# Patient Record
Sex: FEMALE | Race: BLACK OR AFRICAN AMERICAN | NOT HISPANIC OR LATINO
[De-identification: names, ages, dates, MRNs, and addresses within clinical notes are randomized per-mention and may not be internally consistent; named-entity substitution may affect disease eponyms.]

---

## 2021-04-14 PROBLEM — Z00.00 ENCOUNTER FOR PREVENTIVE HEALTH EXAMINATION: Status: ACTIVE | Noted: 2021-04-14

## 2021-04-27 PROBLEM — Z86.2 HISTORY OF COAGULATION DEFECT: Status: RESOLVED | Noted: 2021-04-27 | Resolved: 2021-04-27

## 2021-04-27 PROBLEM — Z80.41 FAMILY HISTORY OF MALIGNANT NEOPLASM OF OVARY: Status: ACTIVE | Noted: 2021-04-27

## 2021-04-27 PROBLEM — Z80.3 FAMILY HISTORY OF MALIGNANT NEOPLASM OF BREAST: Status: ACTIVE | Noted: 2021-04-27

## 2021-04-27 PROBLEM — Z86.39 HISTORY OF HYPERCHOLESTEROLEMIA: Status: RESOLVED | Noted: 2021-04-27 | Resolved: 2021-04-27

## 2021-04-27 PROBLEM — G45.9 TIA (TRANSIENT ISCHEMIC ATTACK): Status: RESOLVED | Noted: 2021-04-27 | Resolved: 2021-04-27

## 2021-04-30 ENCOUNTER — RESULT REVIEW (OUTPATIENT)
Age: 71
End: 2021-04-30

## 2021-04-30 ENCOUNTER — APPOINTMENT (OUTPATIENT)
Dept: HEMATOLOGY ONCOLOGY | Facility: CLINIC | Age: 71
End: 2021-04-30
Payer: MEDICARE

## 2021-04-30 VITALS
HEART RATE: 95 BPM | HEIGHT: 64.96 IN | SYSTOLIC BLOOD PRESSURE: 169 MMHG | WEIGHT: 188 LBS | BODY MASS INDEX: 31.32 KG/M2 | DIASTOLIC BLOOD PRESSURE: 74 MMHG | TEMPERATURE: 98.1 F | RESPIRATION RATE: 16 BRPM | OXYGEN SATURATION: 97 %

## 2021-04-30 DIAGNOSIS — Z80.41 FAMILY HISTORY OF MALIGNANT NEOPLASM OF OVARY: ICD-10-CM

## 2021-04-30 DIAGNOSIS — Z86.2 PERSONAL HISTORY OF DISEASES OF THE BLOOD AND BLOOD-FORMING ORGANS AND CERTAIN DISORDERS INVOLVING THE IMMUNE MECHANISM: ICD-10-CM

## 2021-04-30 DIAGNOSIS — Z86.39 PERSONAL HISTORY OF OTHER ENDOCRINE, NUTRITIONAL AND METABOLIC DISEASE: ICD-10-CM

## 2021-04-30 DIAGNOSIS — Z80.3 FAMILY HISTORY OF MALIGNANT NEOPLASM OF BREAST: ICD-10-CM

## 2021-04-30 DIAGNOSIS — R19.5 OTHER FECAL ABNORMALITIES: ICD-10-CM

## 2021-04-30 DIAGNOSIS — G45.9 TRANSIENT CEREBRAL ISCHEMIC ATTACK, UNSPECIFIED: ICD-10-CM

## 2021-04-30 PROCEDURE — 99205 OFFICE O/P NEW HI 60 MIN: CPT

## 2021-04-30 RX ORDER — AMLODIPINE BESYLATE VALSARTAN HYDROCHLOROTHIAZIDE 10; 25; 320 MG/1; MG/1; MG/1
10-320-25 TABLET, FILM COATED ORAL
Refills: 0 | Status: COMPLETED | COMMUNITY
Start: 2021-04-27 | End: 2021-04-30

## 2021-04-30 RX ORDER — TERBINAFINE HYDROCHLORIDE 250 MG/1
250 TABLET ORAL
Refills: 0 | Status: COMPLETED | COMMUNITY
Start: 2021-04-27 | End: 2021-04-30

## 2021-04-30 NOTE — ASSESSMENT
[FreeTextEntry1] : #  ER + 100%, AL + 5%, Her 2 neg  R sided breast cancer T2N1 stage IIB in 2015, s/p neoadjuvant ddAC-T 5/2016, s/p right mastectomy and ALND 6/2016\par - remains on arimadex - would keep for a minimum of 10 years. Continue with ca++1200, vit D 1000 IU\par CA 27 -29 wnl, elevated CEA\par she is not having symptoms will monitor and if starting to complain of symptoms or abnormalities in blood work will consider obtaining PET\par will check CBC with diff, CMP and vit D.\par \par #joint pains - knees\par 2/2 arimadex\par continue tylenol arthritis and voltaren gel\par \par #lymphedema - will refer to PT\par \par #elevated CEA  and loose stools\par last CNY 2018 - will send to Dr. Alicia Gilbert \par \par #HTN - will refer to Dr. Anisha Hui\par needs better control of BP\par \par #tobacco abuse and elevated CEA\par Will check LD - CT\par \par RTC in 3 months with CBC with diff, CMP, Vit D\par \par

## 2021-04-30 NOTE — PHYSICAL EXAM
[Fully active, able to carry on all pre-disease performance without restriction] : Status 0 - Fully active, able to carry on all pre-disease performance without restriction [Normal] : affect appropriate [de-identified] : R sided mastectomy - L side without masses or axillary adenopathy

## 2021-04-30 NOTE — HISTORY OF PRESENT ILLNESS
[de-identified] : Ms. Petersen is a 70 year old woman who presents for consultation of right breast cancer.\par She reports getting annual mammograms and they were normal until her annual .\par She was previously seen and treated by Dr Wilcox.; diagnosed T2N1 stage IIB in , s/p neoadjuvant ddAC-T 2016, s/p right mastectomy and ALND 2016\par ER + 100%, DE + 5%, Her 2 neg\par Started arimidex 2016, delay in wound healing, wound dehiscence and possible infection\par s/p XRT 2016-2016\par Last vsit with Dr. 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, right thyroid nodule 1.3cm\par 2020 Flow cytometry for erythrocytosis No evidence or immunophenotypic evidence of lymphoproliferative disorder, high grade myelodysplastia, or acute lekemia\par \par She reports numbness to fingers and toes (toes worse), occasionally feels dizzy/lightheaded, taste buds compromised, chronic constipation.\par \par Age of Menarche: 14\par Age of Menopause: 49\par OCP/HRT: denies\par [id number], miscarriage x1\par \par Smoke: started age 20, 2-3 cigarettes per day, quit for a few years, no back to occasional cigarettes due to stress\par ETOH: 1-2 drinks x 2 nights per week\par Illicit: denies\par \par Health Maintenance\par Mammo/US: 2021- normal, not concern\par DEXA: due, last 2018, normal\par Last GYN 2018, due\par PET 2018 unremarkable\par CEA 6.8 (6.2 2021) and Ca 27-29 18.1 (18.9 2021)\par Colonoscopy/EGD last , Dr. 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

## 2021-04-30 NOTE — CONSULT LETTER
[Dear  ___] : Dear  [unfilled], [Consult Letter:] : I had the pleasure of evaluating your patient, [unfilled]. [Please see my note below.] : Please see my note below. [Consult Closing:] : Thank you very much for allowing me to participate in the care of this patient.  If you have any questions, please do not hesitate to contact me. [Sincerely,] : Sincerely, [FreeTextEntry3] : Paola Hui DO, FACBO, FACP\par Medical Oncology and Hematology\par [facility] [place]\par  DC instructions

## 2021-05-27 ENCOUNTER — RESULT REVIEW (OUTPATIENT)
Age: 71
End: 2021-05-27

## 2021-06-16 ENCOUNTER — APPOINTMENT (OUTPATIENT)
Dept: INTERNAL MEDICINE | Facility: CLINIC | Age: 71
End: 2021-06-16
Payer: MEDICARE

## 2021-06-16 ENCOUNTER — NON-APPOINTMENT (OUTPATIENT)
Age: 71
End: 2021-06-16

## 2021-06-16 VITALS
OXYGEN SATURATION: 100 % | HEART RATE: 82 BPM | SYSTOLIC BLOOD PRESSURE: 178 MMHG | TEMPERATURE: 98.2 F | DIASTOLIC BLOOD PRESSURE: 76 MMHG

## 2021-06-16 VITALS — HEIGHT: 65 IN | WEIGHT: 188 LBS | BODY MASS INDEX: 31.32 KG/M2

## 2021-06-16 DIAGNOSIS — Z83.49 FAMILY HISTORY OF OTHER ENDOCRINE, NUTRITIONAL AND METABOLIC DISEASES: ICD-10-CM

## 2021-06-16 DIAGNOSIS — Z76.89 PERSONS ENCOUNTERING HEALTH SERVICES IN OTHER SPECIFIED CIRCUMSTANCES: ICD-10-CM

## 2021-06-16 PROCEDURE — 93000 ELECTROCARDIOGRAM COMPLETE: CPT

## 2021-06-16 PROCEDURE — 99203 OFFICE O/P NEW LOW 30 MIN: CPT | Mod: 25

## 2021-06-16 PROCEDURE — 36415 COLL VENOUS BLD VENIPUNCTURE: CPT

## 2021-06-22 LAB
CHOLEST SERPL-MCNC: 163 MG/DL
HDLC SERPL-MCNC: 39 MG/DL
LDLC SERPL CALC-MCNC: 91 MG/DL
NONHDLC SERPL-MCNC: 125 MG/DL
TRIGL SERPL-MCNC: 167 MG/DL
TSH SERPL-ACNC: 1.77 UIU/ML

## 2021-06-24 NOTE — HISTORY OF PRESENT ILLNESS
[FreeTextEntry1] : New Patient [de-identified] : 71 year-old lady here to establish medical care.  Her previous primary care physician does not accept her current insurance.\par She has a history of hypertension, hyperlipidemia, breast cancer, status post right mastectomy, Chemotherapy, Radiation Therapy, right upper extremity lymphedema, erythrocytosis, coagulopathy, chronic eczema, Thyroid Nodule noted on CTA of Neck, 10/4/2016.\par Eczema started in October. She was seen by Dermatologist for eczema and is currently on topical steroids, Xyzal for the itching.\par She is followed by Dr. Hui: Oncologist.\par Her last eye exam 2 weeks ago; she is being treated for a Viral eye infection.\par She had the COVID Vaccines in April.

## 2021-06-24 NOTE — REVIEW OF SYSTEMS
[Itching] : Itching [Skin Rash] : skin rash [Fever] : no fever [Chills] : no chills [Redness] : no redness [Earache] : no earache [Chest Pain] : no chest pain [Palpitations] : no palpitations [Lower Ext Edema] : no lower extremity edema [Shortness Of Breath] : no shortness of breath [Cough] : no cough [Abdominal Pain] : no abdominal pain [Nausea] : no nausea [Constipation] : no constipation [Melena] : no melena [Dysuria] : no dysuria [Headache] : no headache [Dizziness] : no dizziness [Swollen Glands] : no swollen glands [de-identified] : has diffuse rash on head, back, chest, arms, legs [de-identified] : she is on Bupropion for depression

## 2021-06-24 NOTE — HEALTH RISK ASSESSMENT
[Good] : ~his/her~  mood as  good [] : Yes [Yes] : Yes [2 - 4 times a month (2 pts)] : 2-4 times a month (2 points) [1 or 2 (0 pts)] : 1 or 2 (0 points) [Never (0 pts)] : Never (0 points) [No] : In the past 12 months have you used drugs other than those required for medical reasons? No [No falls in past year] : Patient reported no falls in the past year [Patient reported mammogram was normal] : Patient reported mammogram was normal [Patient reported PAP Smear was normal] : Patient reported PAP Smear was normal [Patient reported bone density results were normal] : Patient reported bone density results were normal [HIV test declined] : HIV test declined [Hepatitis C test declined] : Hepatitis C test declined [0] : 2) Feeling down, depressed, or hopeless: Not at all (0) [FreeTextEntry1] : eczema   [de-identified] : oncology  [de-identified] : smokes here and there  [Audit-CScore] : 2 [de-identified] : daily walks  [de-identified] : normal diet rarely eats beef. Hadly has appetite may eat once a day. [[id number]] : 0 [MammogramDate] : 02/2021 [PapSmearDate] : 01/18 [BoneDensityDate] : 05/2` [ColonoscopyComments] : 19 yrs ago

## 2021-06-24 NOTE — PHYSICAL EXAM
[No Acute Distress] : no acute distress [PERRL] : pupils equal round and reactive to light [EOMI] : extraocular movements intact [No JVD] : no jugular venous distention [No Lymphadenopathy] : no lymphadenopathy [No Respiratory Distress] : no respiratory distress  [Clear to Auscultation] : lungs were clear to auscultation bilaterally [Normal Rate] : normal rate  [Regular Rhythm] : with a regular rhythm [Normal S1, S2] : normal S1 and S2 [No Murmur] : no murmur heard [Pedal Pulses Present] : the pedal pulses are present [Soft] : abdomen soft [Non Tender] : non-tender [Normal Bowel Sounds] : normal bowel sounds [No Focal Deficits] : no focal deficits [Normal Gait] : normal gait [Deep Tendon Reflexes (DTR)] : deep tendon reflexes were 2+ and symmetric [Speech Grossly Normal] : speech grossly normal [Normal Affect] : the affect was normal [Alert and Oriented x3] : oriented to person, place, and time [Normal Insight/Judgement] : insight and judgment were intact [Normal Sclera/Conjunctiva] : normal sclera/conjunctiva [Normal TMs] : both tympanic membranes were normal [No Carotid Bruits] : no carotid bruits [No CVA Tenderness] : no CVA  tenderness [de-identified] : right upper ext edema [de-identified] : healed surgical scar [de-identified] : right upper ext edema [de-identified] : dry, hyperpigmented plaques on back, arms. Scaly rash on scalp

## 2021-07-26 ENCOUNTER — APPOINTMENT (OUTPATIENT)
Dept: HEMATOLOGY ONCOLOGY | Facility: CLINIC | Age: 71
End: 2021-07-26
Payer: MEDICARE

## 2021-08-18 ENCOUNTER — APPOINTMENT (OUTPATIENT)
Dept: HEMATOLOGY ONCOLOGY | Facility: CLINIC | Age: 71
End: 2021-08-18
Payer: MEDICARE

## 2021-08-18 ENCOUNTER — RESULT REVIEW (OUTPATIENT)
Age: 71
End: 2021-08-18

## 2021-08-18 VITALS
TEMPERATURE: 98.3 F | SYSTOLIC BLOOD PRESSURE: 134 MMHG | HEART RATE: 96 BPM | HEIGHT: 66 IN | RESPIRATION RATE: 16 BRPM | BODY MASS INDEX: 29.36 KG/M2 | DIASTOLIC BLOOD PRESSURE: 80 MMHG | OXYGEN SATURATION: 100 % | WEIGHT: 182.7 LBS

## 2021-08-18 DIAGNOSIS — M19.90 UNSPECIFIED OSTEOARTHRITIS, UNSPECIFIED SITE: ICD-10-CM

## 2021-08-18 DIAGNOSIS — Z86.69 PERSONAL HISTORY OF OTHER DISEASES OF THE NERVOUS SYSTEM AND SENSE ORGANS: ICD-10-CM

## 2021-08-18 PROCEDURE — 99214 OFFICE O/P EST MOD 30 MIN: CPT

## 2021-08-19 NOTE — CONSULT LETTER
[Dear  ___] : Dear  [unfilled], [Consult Letter:] : I had the pleasure of evaluating your patient, [unfilled]. [Please see my note below.] : Please see my note below. [Consult Closing:] : Thank you very much for allowing me to participate in the care of this patient.  If you have any questions, please do not hesitate to contact me. [Sincerely,] : Sincerely, [FreeTextEntry3] : Paola Hui DO, FACBO, FACP\par Medical Oncology and Hematology\par [facility] [place]\par

## 2021-08-19 NOTE — REVIEW OF SYSTEMS
[Negative] : Allergic/Immunologic [Fever] : fever [Chills] : chills [Fatigue] : fatigue [Recent Change In Weight] : ~T recent weight change [Eye Pain] : eye pain [Red Eyes] : red eyes [FreeTextEntry3] : itchy [de-identified] : eczema

## 2021-08-19 NOTE — HISTORY OF PRESENT ILLNESS
[de-identified] : Patient seen and examined and here today for follow up\par Denies complaints, overall feeling well.\par Wants PT for lymphedema\par Tolerating AI - has some arthritis/arthralgias and wants to go to PT [de-identified] : Ms. Petersen is a 70 year old woman who presents for consultation of right breast cancer.\par She reports getting annual mammograms and they were normal until her annual .\par She was previously seen and treated by Dr Wilcox.; diagnosed T2N1 stage IIB in , s/p neoadjuvant ddAC-T 2016, s/p right mastectomy and ALND 2016\par ER + 100%, CT + 5%, Her 2 neg\par Started arimidex 2016, delay in wound healing, wound dehiscence and possible infection\par s/p XRT 2016-2016\par Last vsit with Dr. 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, right thyroid nodule 1.3cm\par 2020 Flow cytometry for erythrocytosis No evidence or immunophenotypic evidence of lymphoproliferative disorder, high grade myelodysplastia, or acute lekemia\par \par She reports numbness to fingers and toes (toes worse), occasionally feels dizzy/lightheaded, taste buds compromised, chronic constipation.\par \par Age of Menarche: 14\par Age of Menopause: 49\par OCP/HRT: denies\par [id number], miscarriage x1\par \par Smoke: started age 20, 2-3 cigarettes per day, quit for a few years, no back to occasional cigarettes due to stress\par ETOH: 1-2 drinks x 2 nights per week\par Illicit: denies\par \par Health Maintenance\par Mammo/US: 2021- normal, not concern\par DEXA: due, last 2018, normal\par Last GYN 2018, due\par PET 2018 unremarkable\par CEA 6.8 (6.2 2021) and Ca 27-29 18.1 (18.9 2021)\par Colonoscopy/EGD last , Dr. 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

## 2021-08-19 NOTE — ASSESSMENT
[FreeTextEntry1] : #  ER + 100%, TN + 5%, Her 2 neg  R sided breast cancer T2N1 stage IIB in 2015, s/p neoadjuvant ddAC-T 5/2016, s/p right mastectomy and ALND 6/2016\par - remains on arimadex - would keep for a minimum of 10 years. Continue with ca++1200, vit D 1000 IU\par CA 27 -29 wnl, elevated CEA\par \par #joint pains - knees\par 2/2 arimadex\par continue tylenol arthritis and voltaren gel\par referred for PT\par \par #lymphedema - will refer to PT\par \par #elevated CEA  and loose stools\par last CNY 2018 - will send to Dr. Alicia Gilbert \par \par #HTN - will refer to Dr. Anisha Hui\par needs better control of BP\par \par #tobacco abuse and elevated CEA\par check LD - CT - she didn't get it the last time\par \par RTC in 3 months with CBC with diff, CMP, Vit D\par \par

## 2021-08-19 NOTE — PHYSICAL EXAM
[Fully active, able to carry on all pre-disease performance without restriction] : Status 0 - Fully active, able to carry on all pre-disease performance without restriction [Normal] : affect appropriate [de-identified] : R sided mastectomy - L side without masses or axillary adenopathy

## 2021-10-12 ENCOUNTER — APPOINTMENT (OUTPATIENT)
Dept: INTERNAL MEDICINE | Facility: CLINIC | Age: 71
End: 2021-10-12
Payer: MEDICARE

## 2021-10-12 ENCOUNTER — NON-APPOINTMENT (OUTPATIENT)
Age: 71
End: 2021-10-12

## 2021-10-12 ENCOUNTER — LABORATORY RESULT (OUTPATIENT)
Age: 71
End: 2021-10-12

## 2021-10-12 VITALS
OXYGEN SATURATION: 99 % | RESPIRATION RATE: 16 BRPM | WEIGHT: 178 LBS | BODY MASS INDEX: 28.61 KG/M2 | SYSTOLIC BLOOD PRESSURE: 144 MMHG | DIASTOLIC BLOOD PRESSURE: 88 MMHG | HEIGHT: 66 IN | TEMPERATURE: 98 F | HEART RATE: 94 BPM

## 2021-10-12 DIAGNOSIS — Z23 ENCOUNTER FOR IMMUNIZATION: ICD-10-CM

## 2021-10-12 PROCEDURE — 99213 OFFICE O/P EST LOW 20 MIN: CPT | Mod: 25

## 2021-10-12 PROCEDURE — G0008: CPT

## 2021-10-12 PROCEDURE — 90662 IIV NO PRSV INCREASED AG IM: CPT

## 2021-10-12 PROCEDURE — 36415 COLL VENOUS BLD VENIPUNCTURE: CPT

## 2021-10-12 PROCEDURE — 99203 OFFICE O/P NEW LOW 30 MIN: CPT | Mod: 25

## 2021-10-12 PROCEDURE — 93000 ELECTROCARDIOGRAM COMPLETE: CPT

## 2021-10-18 LAB
ALBUMIN SERPL ELPH-MCNC: 3.8 G/DL
ALP BLD-CCNC: 84 U/L
ALT SERPL-CCNC: 12 U/L
ANION GAP SERPL CALC-SCNC: 13 MMOL/L
APPEARANCE: ABNORMAL
APTT BLD: 32.3 SEC
AST SERPL-CCNC: 15 U/L
BACTERIA: ABNORMAL
BASOPHILS # BLD AUTO: 0.07 K/UL
BASOPHILS NFR BLD AUTO: 1.5 %
BILIRUB SERPL-MCNC: 0.7 MG/DL
BILIRUBIN URINE: ABNORMAL
BLOOD URINE: NEGATIVE
BUN SERPL-MCNC: 6 MG/DL
CALCIUM SERPL-MCNC: 10.1 MG/DL
CHLORIDE SERPL-SCNC: 99 MMOL/L
CHOLEST SERPL-MCNC: 190 MG/DL
CO2 SERPL-SCNC: 28 MMOL/L
COLOR: ABNORMAL
CREAT SERPL-MCNC: 0.88 MG/DL
EOSINOPHIL # BLD AUTO: 0.29 K/UL
EOSINOPHIL NFR BLD AUTO: 6.2 %
GLUCOSE QUALITATIVE U: NEGATIVE
GLUCOSE SERPL-MCNC: 122 MG/DL
HCT VFR BLD CALC: 48 %
HDLC SERPL-MCNC: 46 MG/DL
HGB BLD-MCNC: 15.6 G/DL
HYALINE CASTS: 0 /LPF
IMM GRANULOCYTES NFR BLD AUTO: 0.4 %
INR PPP: 0.98 RATIO
KETONES URINE: NEGATIVE
LDLC SERPL CALC-MCNC: 122 MG/DL
LEUKOCYTE ESTERASE URINE: ABNORMAL
LYMPHOCYTES # BLD AUTO: 0.53 K/UL
LYMPHOCYTES NFR BLD AUTO: 11.3 %
MAN DIFF?: NORMAL
MCHC RBC-ENTMCNC: 32.5 GM/DL
MCHC RBC-ENTMCNC: 35.9 PG
MCV RBC AUTO: 110.3 FL
MICROSCOPIC-UA: NORMAL
MONOCYTES # BLD AUTO: 0.59 K/UL
MONOCYTES NFR BLD AUTO: 12.6 %
NEUTROPHILS # BLD AUTO: 3.19 K/UL
NEUTROPHILS NFR BLD AUTO: 68 %
NITRITE URINE: NEGATIVE
NONHDLC SERPL-MCNC: 145 MG/DL
PH URINE: 5.5
PLATELET # BLD AUTO: 277 K/UL
POTASSIUM SERPL-SCNC: 4.1 MMOL/L
PROT SERPL-MCNC: 6.7 G/DL
PROTEIN URINE: ABNORMAL
PT BLD: 11.6 SEC
RBC # BLD: 4.35 M/UL
RBC # FLD: 21.2 %
RED BLOOD CELLS URINE: 3 /HPF
SODIUM SERPL-SCNC: 139 MMOL/L
SPECIFIC GRAVITY URINE: 1.03
SQUAMOUS EPITHELIAL CELLS: 20 /HPF
TRIGL SERPL-MCNC: 115 MG/DL
URINE COMMENTS: NORMAL
UROBILINOGEN URINE: ABNORMAL
WBC # FLD AUTO: 4.69 K/UL
WHITE BLOOD CELLS URINE: 4 /HPF

## 2021-10-22 NOTE — REVIEW OF SYSTEMS
[Skin Rash] : skin rash [Anxiety] : anxiety [Fever] : no fever [Chills] : no chills [Pain] : no pain [Redness] : no redness [Earache] : no earache [Hoarseness] : no hoarseness [Sore Throat] : no sore throat [Postnasal Drip] : no postnasal drip [Chest Pain] : no chest pain [Palpitations] : no palpitations [Lower Ext Edema] : no lower extremity edema [Shortness Of Breath] : no shortness of breath [Cough] : no cough [Abdominal Pain] : no abdominal pain [Constipation] : no constipation [Diarrhea] : diarrhea [Dysuria] : no dysuria [Joint Swelling] : no joint swelling [Headache] : no headache [Dizziness] : no dizziness [Suicidal] : not suicidal [Depression] : no depression [Easy Bleeding] : no easy bleeding [Swollen Glands] : no swollen glands [FreeTextEntry3] : last eye exam 1 day ago; has lesions on lower lids [FreeTextEntry9] : Pain in joints

## 2021-10-22 NOTE — HISTORY OF PRESENT ILLNESS
[No Adverse Anesthesia Reaction] : no adverse anesthesia reaction in self or family member [Coronary Artery Disease] : coronary artery disease [Aortic Stenosis] : no aortic stenosis [Atrial Fibrillation] : no atrial fibrillation [Recent Myocardial Infarction] : no recent myocardial infarction [Implantable Device/Pacemaker] : no implantable device/pacemaker [Asthma] : no asthma [COPD] : no COPD [Sleep Apnea] : no sleep apnea [Smoker] : not a smoker [Chronic Anticoagulation] : no chronic anticoagulation [Chronic Kidney Disease] : no chronic kidney disease [Diabetes] : no diabetes [FreeTextEntry1] : Removal of Lower Eye Lid Lesions and Blepharoplasty [FreeTextEntry2] : 11/09/2021 [FreeTextEntry4] : Preoperative medical evaluation of this 71 year-old lady for removal of Lower Eyelid Lesions and Blepharoplasty.\par She has a history of Hypertension, Hyperlipidemia, Thyroid Nodule, Breast Cancer status post right mastectomy, chemotherapy and radiation therapy.  She has Chronic Lymphedema on the right upper Extremity.\par She denies complications with Anesthesia in the past and has no history of Bleeding Diathesis.

## 2021-10-22 NOTE — PHYSICAL EXAM
[No Acute Distress] : no acute distress [Normal Sclera/Conjunctiva] : normal sclera/conjunctiva [EOMI] : extraocular movements intact [No JVD] : no jugular venous distention [No Lymphadenopathy] : no lymphadenopathy [No Respiratory Distress] : no respiratory distress  [Clear to Auscultation] : lungs were clear to auscultation bilaterally [Normal Rate] : normal rate  [Regular Rhythm] : with a regular rhythm [Normal S1, S2] : normal S1 and S2 [No Murmur] : no murmur heard [No Carotid Bruits] : no carotid bruits [Pedal Pulses Present] : the pedal pulses are present [No Edema] : there was no peripheral edema [No Axillary Lymphadenopathy] : no axillary lymphadenopathy [Soft] : abdomen soft [Non Tender] : non-tender [Normal Bowel Sounds] : normal bowel sounds [Normal Supraclavicular Nodes] : no supraclavicular lymphadenopathy [Normal Axillary Nodes] : no axillary lymphadenopathy [Normal Posterior Cervical Nodes] : no posterior cervical lymphadenopathy [Normal Anterior Cervical Nodes] : no anterior cervical lymphadenopathy [No CVA Tenderness] : no CVA  tenderness [No Joint Swelling] : no joint swelling [Normal] : normal gait, coordination grossly intact, no focal deficits and deep tendon reflexes were 2+ and symmetric [Normal Outer Ear/Nose] : the outer ears and nose were normal in appearance [Normal TMs] : both tympanic membranes were normal [de-identified] : Whitish, nodular lesions on right and left lower lids [de-identified] : s/p right Mastectomy [FreeTextEntry1] : decrease Lymphedema of right upper extremity [de-identified] : dry skin; hyperpigmentation noted

## 2021-10-22 NOTE — ASSESSMENT
[Patient Optimized for Surgery] : Patient optimized for surgery [No Further Testing Recommended] : no further testing recommended [FreeTextEntry4] : 1.  There is no absolute contraindication to planned surgical procedure. \par 2.  Patient is at low risk for cardiac complications: she has no chest pain, no acute Ischemic changes on EKG, no history of Diabetes.\par 3.  Preoperative Labs reviewed and are within acceptable limits.\par 4.  Patient is Medically Stable to undergo Removal of Eyelid Lesions and Blepharoplasty. \par 5.  Patient is aware to hold Aspirin one week prior to Surgery.\par

## 2021-10-26 ENCOUNTER — RESULT REVIEW (OUTPATIENT)
Age: 71
End: 2021-10-26

## 2021-10-28 ENCOUNTER — NON-APPOINTMENT (OUTPATIENT)
Age: 71
End: 2021-10-28

## 2021-10-28 LAB
FOLATE SERPL-MCNC: 2.6 NG/ML
VIT B12 SERPL-MCNC: 465 PG/ML

## 2021-10-29 ENCOUNTER — APPOINTMENT (OUTPATIENT)
Dept: ENDOCRINOLOGY | Facility: CLINIC | Age: 71
End: 2021-10-29
Payer: MEDICARE

## 2021-10-29 VITALS
HEIGHT: 66 IN | WEIGHT: 177 LBS | HEART RATE: 91 BPM | DIASTOLIC BLOOD PRESSURE: 96 MMHG | BODY MASS INDEX: 28.45 KG/M2 | OXYGEN SATURATION: 94 % | SYSTOLIC BLOOD PRESSURE: 140 MMHG

## 2021-10-29 PROCEDURE — 99203 OFFICE O/P NEW LOW 30 MIN: CPT

## 2021-10-29 NOTE — HISTORY OF PRESENT ILLNESS
[FreeTextEntry1] : Oct 29, 2021\par \par PCP:   Dr. Lianna Aguilar\par           H/O: Paola Ez    2015 breast cancer \par \par CC:  Thyroid nodule    6/2021 TSH 1.77     calcium 10.5 **    PET CT - thyroid negative  \par                       L adrenal thickening - likely adenoma\par             (folate 2.6)\par             (FBS ~ 110)\par             (very good BD)\par \par Went for a recent ultrasound of the thyroid:\par \par HISTORY: 1.3 cm right thyroid nodule noted on prior outside CTA in 2016\par \par Real-time examination was performed. Prior outside CTA is not available for comparison.\par \par The right thyroid lobe measures 6 x 2.6 x 2.5 cm, the isthmus up to 2.8 mm AP in the midline, and the left thyroid lobe measures 4.2 x 2.2 x 1.8 cm. There is marked diffuse heterogeneity of thyroid parenchymal echogenicity. A lobulated and hyperechoic right mid-lower pole solid nodule (or conglomerate of contiguous nodules) measures 2.5 x 1.7 x 2.4 cm.\par \par \par IMPRESSION: Consideration should be given to ultrasound-guided percutaneous fine-needle aspiration biopsy of the right mid-lower pole nodular area to exclude the possibility of malignancy.\par  \par \par --- End of Report ---\par \par ***Electronically Signed ***\par -----------------------------------------------\par Alvaro Olvera MD              10/26/21\par \par \par On exam, slight fullness R lobe.  Non-tender\par \par \par Plan:  To [place] for FNAB as advised by Rdiology and call me on my cell one week later.\par ROV by March

## 2021-11-09 ENCOUNTER — HOSPITAL ENCOUNTER (OUTPATIENT)
Dept: HOSPITAL 74 - FASU | Age: 71
Discharge: HOME | End: 2021-11-09
Attending: OPHTHALMOLOGY
Payer: COMMERCIAL

## 2021-11-09 VITALS — DIASTOLIC BLOOD PRESSURE: 81 MMHG | SYSTOLIC BLOOD PRESSURE: 135 MMHG | HEART RATE: 62 BPM

## 2021-11-09 VITALS — TEMPERATURE: 98.4 F

## 2021-11-09 VITALS — BODY MASS INDEX: 28.5 KG/M2

## 2021-11-09 DIAGNOSIS — H02.132: Primary | ICD-10-CM

## 2021-11-09 DIAGNOSIS — H04.552: ICD-10-CM

## 2021-11-09 DIAGNOSIS — H04.551: ICD-10-CM

## 2021-11-09 DIAGNOSIS — H02.135: ICD-10-CM

## 2021-11-09 PROCEDURE — 08BR0ZX EXCISION OF LEFT LOWER EYELID, OPEN APPROACH, DIAGNOSTIC: ICD-10-PCS | Performed by: OPHTHALMOLOGY

## 2021-11-09 PROCEDURE — 08SR0ZZ REPOSITION LEFT LOWER EYELID, OPEN APPROACH: ICD-10-PCS | Performed by: OPHTHALMOLOGY

## 2021-11-09 PROCEDURE — 08BQ0ZX EXCISION OF RIGHT LOWER EYELID, OPEN APPROACH, DIAGNOSTIC: ICD-10-PCS | Performed by: OPHTHALMOLOGY

## 2021-11-09 PROCEDURE — 08SQ0ZZ REPOSITION RIGHT LOWER EYELID, OPEN APPROACH: ICD-10-PCS | Performed by: OPHTHALMOLOGY

## 2021-11-17 ENCOUNTER — RESULT REVIEW (OUTPATIENT)
Age: 71
End: 2021-11-17

## 2021-11-17 ENCOUNTER — APPOINTMENT (OUTPATIENT)
Dept: HEMATOLOGY ONCOLOGY | Facility: CLINIC | Age: 71
End: 2021-11-17
Payer: MEDICARE

## 2021-11-17 VITALS
OXYGEN SATURATION: 97 % | RESPIRATION RATE: 18 BRPM | DIASTOLIC BLOOD PRESSURE: 80 MMHG | HEART RATE: 74 BPM | BODY MASS INDEX: 28.62 KG/M2 | WEIGHT: 178.06 LBS | HEIGHT: 66 IN | TEMPERATURE: 97.5 F | SYSTOLIC BLOOD PRESSURE: 166 MMHG

## 2021-11-17 PROCEDURE — 99215 OFFICE O/P EST HI 40 MIN: CPT | Mod: 25

## 2021-11-17 PROCEDURE — 36415 COLL VENOUS BLD VENIPUNCTURE: CPT

## 2021-11-17 NOTE — HISTORY OF PRESENT ILLNESS
[de-identified] : Ms. Petersen is a 70 year old woman who presents for consultation of right breast cancer.\par She reports getting annual mammograms and they were normal until her annual .\par She was previously seen and treated by Dr Wilcox.; diagnosed T2N1 stage IIB in , s/p neoadjuvant ddAC-T 2016, s/p right mastectomy and ALND 2016\par ER + 100%, SD + 5%, Her 2 neg\par Started arimidex 2016, delay in wound healing, wound dehiscence and possible infection\par s/p XRT 2016-2016\par Last vsit with Dr. 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, right thyroid nodule 1.3cm\par 2020 Flow cytometry for erythrocytosis No evidence or immunophenotypic evidence of lymphoproliferative disorder, high grade myelodysplastia, or acute lekemia\par \par She reports numbness to fingers and toes (toes worse), occasionally feels dizzy/lightheaded, taste buds compromised, chronic constipation.\par \par Age of Menarche: 14\par Age of Menopause: 49\par OCP/HRT: denies\par [id number], miscarriage x1\par \par Smoke: started age 20, 2-3 cigarettes per day, quit for a few years, no back to occasional cigarettes due to stress\par ETOH: 1-2 drinks x 2 nights per week\par Illicit: denies\par \par Health Maintenance\par Mammo/US: 2021- normal, not concern\par DEXA: due, last 2018, normal\par Last GYN 2018, due\par PET 2018 unremarkable\par CEA 6.8 (6.2 2021) and Ca 27-29 18.1 (18.9 2021)\par Colonoscopy/EGD last , Dr. 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

## 2021-11-17 NOTE — ASSESSMENT
[FreeTextEntry1] : #  ER + 100%, MI + 5%, Her 2 neg  R sided breast cancer T2N1 stage IIB in 2015, s/p neoadjuvant ddAC-T 5/2016, s/p right mastectomy and ALND 6/2016\par - remains on arimadex - would keep for a minimum of 10 years. Continue with ca++1200, vit D 1000 IU\par CA 27 -29 wnl, elevated CEA\par \par #joint pains - knees\par 2/2 arimadex\par continue tylenol arthritis and voltaren gel\par referred for PT\par \par #lymphedema - will refer to PT\par \par #elevated CEA  and loose stools\par last CNY 2018 - will send to Dr. Oconnor\par \par #HTN - will refer to Dr. Anisha Hui\par needs better control of BP\par \par #tobacco abuse and elevated CEA\par check LD - CT - she didn't get it the last time\par \par #erythrocytosis/macrocytosis\par folate 2.6 - started on folic acid\par will check iron, ferritin, MPNR, HFE, Epo, flow cytometry, b12/folate\par \par #family history of cancer\par will check invitae at [place]\par \par RTC in 2 months with CBC with diff, CMP, Vit D, iron, ferritin, MPNR, HFE, Epo, invitae, flow cytometry, b12/folate\par \par

## 2021-11-17 NOTE — PHYSICAL EXAM
[Fully active, able to carry on all pre-disease performance without restriction] : Status 0 - Fully active, able to carry on all pre-disease performance without restriction [Normal] : affect appropriate [de-identified] : R sided mastectomy - L side without masses or axillary adenopathy

## 2021-11-17 NOTE — REVIEW OF SYSTEMS
[Fever] : fever [Chills] : chills [Fatigue] : fatigue [Recent Change In Weight] : ~T recent weight change [Eye Pain] : eye pain [Red Eyes] : red eyes [Negative] : Allergic/Immunologic [FreeTextEntry3] : itchy [de-identified] : eczema

## 2021-11-17 NOTE — CONSULT LETTER
[Dear  ___] : Dear  [unfilled], [Consult Letter:] : I had the pleasure of evaluating your patient, [unfilled]. [Please see my note below.] : Please see my note below. [Consult Closing:] : Thank you very much for allowing me to participate in the care of this patient.  If you have any questions, please do not hesitate to contact me. [Sincerely,] : Sincerely, [FreeTextEntry3] : Paola Hui DO, FACBO, FACP\par Medical Oncology and Hematology\par [facility] [place]\par

## 2021-11-20 ENCOUNTER — APPOINTMENT (OUTPATIENT)
Dept: INTERNAL MEDICINE | Facility: CLINIC | Age: 71
End: 2021-11-20
Payer: MEDICARE

## 2021-11-20 ENCOUNTER — LABORATORY RESULT (OUTPATIENT)
Age: 71
End: 2021-11-20

## 2021-11-20 VITALS
OXYGEN SATURATION: 96 % | WEIGHT: 175 LBS | HEART RATE: 83 BPM | HEIGHT: 66 IN | BODY MASS INDEX: 28.12 KG/M2 | SYSTOLIC BLOOD PRESSURE: 142 MMHG | RESPIRATION RATE: 18 BRPM | DIASTOLIC BLOOD PRESSURE: 80 MMHG

## 2021-11-20 DIAGNOSIS — Z23 ENCOUNTER FOR IMMUNIZATION: ICD-10-CM

## 2021-11-20 DIAGNOSIS — R20.2 PARESTHESIA OF SKIN: ICD-10-CM

## 2021-11-20 PROCEDURE — G0442 ANNUAL ALCOHOL SCREEN 15 MIN: CPT | Mod: 59

## 2021-11-20 PROCEDURE — 99214 OFFICE O/P EST MOD 30 MIN: CPT | Mod: 25

## 2021-11-20 PROCEDURE — G0439: CPT

## 2021-11-20 PROCEDURE — 90732 PPSV23 VACC 2 YRS+ SUBQ/IM: CPT

## 2021-11-20 PROCEDURE — 36415 COLL VENOUS BLD VENIPUNCTURE: CPT

## 2021-11-20 PROCEDURE — G0009: CPT

## 2021-12-02 LAB
ALBUMIN SERPL ELPH-MCNC: 4.3 G/DL
ALP BLD-CCNC: 91 U/L
ALT SERPL-CCNC: 18 U/L
ANION GAP SERPL CALC-SCNC: 17 MMOL/L
APPEARANCE: ABNORMAL
AST SERPL-CCNC: 18 U/L
BACTERIA: ABNORMAL
BASOPHILS # BLD AUTO: 0.07 K/UL
BASOPHILS NFR BLD AUTO: 1 %
BILIRUB SERPL-MCNC: 0.5 MG/DL
BILIRUBIN URINE: ABNORMAL
BLOOD URINE: NEGATIVE
BUN SERPL-MCNC: 10 MG/DL
CALCIUM SERPL-MCNC: 10.6 MG/DL
CHLORIDE SERPL-SCNC: 95 MMOL/L
CHOLEST SERPL-MCNC: 226 MG/DL
CO2 SERPL-SCNC: 27 MMOL/L
COLOR: ABNORMAL
CREAT SERPL-MCNC: 0.9 MG/DL
EOSINOPHIL # BLD AUTO: 0.16 K/UL
EOSINOPHIL NFR BLD AUTO: 2.2 %
FOLATE SERPL-MCNC: >20 NG/ML
GLUCOSE QUALITATIVE U: NEGATIVE
GLUCOSE SERPL-MCNC: 120 MG/DL
HCT VFR BLD CALC: 53.3 %
HDLC SERPL-MCNC: 51 MG/DL
HGB BLD-MCNC: 18.1 G/DL
HYALINE CASTS: 0 /LPF
IMM GRANULOCYTES NFR BLD AUTO: 0.4 %
KETONES URINE: NORMAL
LDLC SERPL CALC-MCNC: 148 MG/DL
LEUKOCYTE ESTERASE URINE: NEGATIVE
LYMPHOCYTES # BLD AUTO: 0.54 K/UL
LYMPHOCYTES NFR BLD AUTO: 7.6 %
MAN DIFF?: NORMAL
MCHC RBC-ENTMCNC: 34 GM/DL
MCHC RBC-ENTMCNC: 36.6 PG
MCV RBC AUTO: 107.9 FL
MICROSCOPIC-UA: NORMAL
MONOCYTES # BLD AUTO: 0.63 K/UL
MONOCYTES NFR BLD AUTO: 8.8 %
NEUTROPHILS # BLD AUTO: 5.69 K/UL
NEUTROPHILS NFR BLD AUTO: 80 %
NITRITE URINE: NEGATIVE
NONHDLC SERPL-MCNC: 175 MG/DL
PH URINE: 5
PLATELET # BLD AUTO: 289 K/UL
POTASSIUM SERPL-SCNC: 4.1 MMOL/L
PROT SERPL-MCNC: 7.8 G/DL
PROTEIN URINE: NORMAL
RBC # BLD: 4.94 M/UL
RBC # FLD: 13.7 %
RED BLOOD CELLS URINE: 2 /HPF
SODIUM SERPL-SCNC: 139 MMOL/L
SPECIFIC GRAVITY URINE: 1.03
SQUAMOUS EPITHELIAL CELLS: 20 /HPF
T4 FREE SERPL-MCNC: 1.3 NG/DL
TRIGL SERPL-MCNC: 137 MG/DL
TSH SERPL-ACNC: 0.58 UIU/ML
UROBILINOGEN URINE: ABNORMAL
WBC # FLD AUTO: 7.12 K/UL
WHITE BLOOD CELLS URINE: 5 /HPF

## 2021-12-02 NOTE — HEALTH RISK ASSESSMENT
[Patient reported mammogram was normal] : Patient reported mammogram was normal [Patient reported PAP Smear was normal] : Patient reported PAP Smear was normal [Patient reported bone density results were normal] : Patient reported bone density results were normal [HIV test declined] : HIV test declined [Hepatitis C test declined] : Hepatitis C test declined [] : Yes [Yes] : Yes [2 - 4 times a month (2 pts)] : 2-4 times a month (2 points) [1 or 2 (0 pts)] : 1 or 2 (0 points) [Never (0 pts)] : Never (0 points) [0] : 2) Feeling down, depressed, or hopeless: Not at all (0) [PHQ-2 Negative - No further assessment needed] : PHQ-2 Negative - No further assessment needed [Audit-CScore] : 2 [[id number]] : 0 [MammogramDate] : 02/21 [PapSmearDate] : 01/18 [BoneDensityDate] : 05/21

## 2021-12-02 NOTE — HISTORY OF PRESENT ILLNESS
[FreeTextEntry1] : Annual Visit [de-identified] : 71 year-old lady history of hypertension, hyperlipidemia, breast cancer, thyroid nodule, low folic acid, here for her annual physical.\par She was referred to Dr. Hellerman endocrinologist for further evaluation of the thyroid 2.5 x 1.7 x 2.4 cm thyroid nodule nodule.  Biopsy pending.\par She recently underwent surgery to remove lesions on her  lower eyelids.  Pathology of the lesions not available..\par She is c/o pruritic rash; she has been using Clobetasol Cream with limited response.  She wants to see a new Dermatologist.

## 2021-12-02 NOTE — PHYSICAL EXAM
[No Acute Distress] : no acute distress [Normal Sclera/Conjunctiva] : normal sclera/conjunctiva [EOMI] : extraocular movements intact [Normal Outer Ear/Nose] : the outer ears and nose were normal in appearance [Normal TMs] : both tympanic membranes were normal [No JVD] : no jugular venous distention [No Lymphadenopathy] : no lymphadenopathy [No Respiratory Distress] : no respiratory distress  [Clear to Auscultation] : lungs were clear to auscultation bilaterally [Normal Rate] : normal rate  [Regular Rhythm] : with a regular rhythm [Normal S1, S2] : normal S1 and S2 [No Murmur] : no murmur heard [No Carotid Bruits] : no carotid bruits [Pedal Pulses Present] : the pedal pulses are present [No Edema] : there was no peripheral edema [No Nipple Discharge] : no nipple discharge [No Axillary Lymphadenopathy] : no axillary lymphadenopathy [Soft] : abdomen soft [Non Tender] : non-tender [Normal Bowel Sounds] : normal bowel sounds [Normal Supraclavicular Nodes] : no supraclavicular lymphadenopathy [Normal Axillary Nodes] : no axillary lymphadenopathy [Normal Posterior Cervical Nodes] : no posterior cervical lymphadenopathy [Normal Anterior Cervical Nodes] : no anterior cervical lymphadenopathy [No CVA Tenderness] : no CVA  tenderness [No Joint Swelling] : no joint swelling [Coordination Grossly Intact] : coordination grossly intact [Normal Gait] : normal gait [Deep Tendon Reflexes (DTR)] : deep tendon reflexes were 2+ and symmetric [Normal] : affect was normal and insight and judgment were intact [de-identified] : s/p right Mastectomy [FreeTextEntry1] : decrease Lymphedema of right upper extremity [de-identified] : dry skin; hyperpigmentation noted [de-identified] : sensation to pinprick intact on feet

## 2021-12-02 NOTE — REVIEW OF SYSTEMS
[Joint Pain] : joint pain [Itching] : Itching [Skin Rash] : skin rash [Fever] : no fever [Pain] : no pain [Redness] : no redness [Earache] : no earache [Sore Throat] : no sore throat [Chest Pain] : no chest pain [Palpitations] : no palpitations [Lower Ext Edema] : no lower extremity edema [Shortness Of Breath] : no shortness of breath [Abdominal Pain] : no abdominal pain [Constipation] : no constipation [Melena] : no melena [Dysuria] : no dysuria [Incontinence] : no incontinence [Joint Stiffness] : no joint stiffness [Headache] : no headache [Dizziness] : no dizziness [Suicidal] : not suicidal [Depression] : no depression [Easy Bleeding] : no easy bleeding [Swollen Glands] : no swollen glands

## 2021-12-12 ENCOUNTER — RESULT REVIEW (OUTPATIENT)
Age: 71
End: 2021-12-12

## 2021-12-13 ENCOUNTER — NON-APPOINTMENT (OUTPATIENT)
Age: 71
End: 2021-12-13

## 2021-12-13 ENCOUNTER — APPOINTMENT (OUTPATIENT)
Dept: GASTROENTEROLOGY | Facility: CLINIC | Age: 71
End: 2021-12-13
Payer: MEDICARE

## 2021-12-13 ENCOUNTER — RESULT REVIEW (OUTPATIENT)
Age: 71
End: 2021-12-13

## 2021-12-13 VITALS
TEMPERATURE: 97.8 F | BODY MASS INDEX: 27.32 KG/M2 | WEIGHT: 170 LBS | HEIGHT: 66 IN | HEART RATE: 80 BPM | OXYGEN SATURATION: 5 % | DIASTOLIC BLOOD PRESSURE: 74 MMHG | SYSTOLIC BLOOD PRESSURE: 130 MMHG

## 2021-12-13 DIAGNOSIS — R97.0 ELEVATED CARCINOEMBRYONIC ANTIGEN [CEA]: ICD-10-CM

## 2021-12-13 DIAGNOSIS — Z12.11 ENCOUNTER FOR SCREENING FOR MALIGNANT NEOPLASM OF COLON: ICD-10-CM

## 2021-12-13 PROCEDURE — 99204 OFFICE O/P NEW MOD 45 MIN: CPT

## 2021-12-13 RX ORDER — BUPROPION HYDROCHLORIDE 100 MG/1
100 TABLET, FILM COATED ORAL DAILY
Refills: 0 | Status: DISCONTINUED | COMMUNITY
Start: 2021-06-16 | End: 2021-12-13

## 2021-12-13 NOTE — ASSESSMENT
[FreeTextEntry1] : Will plan on a colonoscopy for screening, elevated CEA, change in bowel habits.  Explained risks/benefits/alternatives including not limited to bleeding, infection, perforation, missed lesions, anesthesia complications.  Patient understands and agrees, all questions answered.  Will use a split dose miralax/gatorade prep with clears the day prior.\par \par Thank you for referring Ms. COE.  Please do not hesitate to call to further discuss his/her care.\par \par Note faxed to requesting MD.\par \par

## 2021-12-13 NOTE — HISTORY OF PRESENT ILLNESS
[FreeTextEntry1] : Ms. Larson is a pleasant 71F h/o HTN, HLD, breast cancer s/p mastectomy, , TIA, thyroid nodule, arthritis who is referred by Dr. Aguilar for colon cancer screening, Dr. Hui for elevated CEA.\par \par She has had 3 colonoscopies in the past, thinks he last was 5 years ago.  \par \par Over the past several months, possibly up to 1 year, she has had a change in her bowel habits. She has often had loose brown stool once daily, no blood, no mucus.  Her stool is rarely if ever formed. She does not have additional diarrhea later in the day.\par \par No weight change.\par \par No upper GI complaints such as heartburn or regurgitation.\par \rony Does not smoke or drink.\par \par No FHx of any GI malignancies.\par \rony Had her CEA previously drawn by Dr. Hui, mildly elevated, advised to have a repeat colonoscopy.

## 2021-12-14 ENCOUNTER — RESULT REVIEW (OUTPATIENT)
Age: 71
End: 2021-12-14

## 2021-12-27 ENCOUNTER — APPOINTMENT (OUTPATIENT)
Dept: INTERNAL MEDICINE | Facility: CLINIC | Age: 71
End: 2021-12-27
Payer: MEDICARE

## 2021-12-27 VITALS
SYSTOLIC BLOOD PRESSURE: 140 MMHG | BODY MASS INDEX: 27.92 KG/M2 | WEIGHT: 173 LBS | OXYGEN SATURATION: 95 % | HEART RATE: 108 BPM | DIASTOLIC BLOOD PRESSURE: 82 MMHG

## 2021-12-27 PROCEDURE — 99214 OFFICE O/P EST MOD 30 MIN: CPT

## 2021-12-31 NOTE — HISTORY OF PRESENT ILLNESS
[FreeTextEntry1] : Follow up after Thyroid Biopsy [de-identified] : 70 y/o lady here for follow up.\par She is s/p Thyroid Biopsy.  Pathology: Hyperplastic/Adenomatous Follicular Nodule.  She will follow up with Endocrinology.\par The rash on Skin has flared.  She has been using Cetaphil, Aveeno, Clobetasol without much relief.  She has an appointment with Dermatologist in January.

## 2021-12-31 NOTE — PHYSICAL EXAM
[No Respiratory Distress] : no respiratory distress  [Normal Rate] : normal rate  [Regular Rhythm] : with a regular rhythm [Normal S1, S2] : normal S1 and S2 [No Murmur] : no murmur heard [No Acute Distress] : no acute distress [Soft] : abdomen soft [Non Tender] : non-tender [Normal Supraclavicular Nodes] : no supraclavicular lymphadenopathy [Normal Posterior Cervical Nodes] : no posterior cervical lymphadenopathy [Normal Anterior Cervical Nodes] : no anterior cervical lymphadenopathy [No Joint Swelling] : no joint swelling [de-identified] : Scaly rash with a erythematous base on face, neck, chest, back of thighs

## 2021-12-31 NOTE — REVIEW OF SYSTEMS
[Itching] : Itching [Skin Rash] : skin rash [Fever] : no fever [Pain] : no pain [Sore Throat] : no sore throat [Chest Pain] : no chest pain [Shortness Of Breath] : no shortness of breath [Cough] : no cough [Joint Pain] : no joint pain [FreeTextEntry3] : s/p removal of eyelid lesions and Blepharoplasty  [de-identified] : red, scaly on face, neck, chest, posterior thighs

## 2022-01-18 ENCOUNTER — RESULT REVIEW (OUTPATIENT)
Age: 72
End: 2022-01-18

## 2022-01-18 ENCOUNTER — APPOINTMENT (OUTPATIENT)
Dept: HEMATOLOGY ONCOLOGY | Facility: CLINIC | Age: 72
End: 2022-01-18
Payer: MEDICARE

## 2022-01-18 VITALS
SYSTOLIC BLOOD PRESSURE: 145 MMHG | HEIGHT: 66 IN | HEART RATE: 69 BPM | DIASTOLIC BLOOD PRESSURE: 72 MMHG | BODY MASS INDEX: 27.55 KG/M2 | TEMPERATURE: 97.9 F | RESPIRATION RATE: 16 BRPM | OXYGEN SATURATION: 99 % | WEIGHT: 171.44 LBS

## 2022-01-18 DIAGNOSIS — R71.8 OTHER ABNORMALITY OF RED BLOOD CELLS: ICD-10-CM

## 2022-01-18 DIAGNOSIS — K59.00 CONSTIPATION, UNSPECIFIED: ICD-10-CM

## 2022-01-18 DIAGNOSIS — Z72.0 TOBACCO USE: ICD-10-CM

## 2022-01-18 DIAGNOSIS — D75.1 SECONDARY POLYCYTHEMIA: ICD-10-CM

## 2022-01-18 DIAGNOSIS — I89.0 LYMPHEDEMA, NOT ELSEWHERE CLASSIFIED: ICD-10-CM

## 2022-01-18 PROCEDURE — 36415 COLL VENOUS BLD VENIPUNCTURE: CPT

## 2022-01-18 PROCEDURE — 99214 OFFICE O/P EST MOD 30 MIN: CPT | Mod: 25

## 2022-01-18 NOTE — PHYSICAL EXAM
[de-identified] : Right sided mastectomy - Left side without masses or axillary adenopathy [de-identified] : patches of hyperpigmentation from previous rash

## 2022-01-18 NOTE — REVIEW OF SYSTEMS
[Fever] : no fever [Chills] : no chills [Fatigue] : no fatigue [Recent Change In Weight] : ~T no recent weight change [Eye Pain] : no eye pain [Red Eyes] : eyes not red [Negative] : Integumentary [FreeTextEntry2] : review of systems completed, negative unless otherwise noted above

## 2022-01-18 NOTE — ASSESSMENT
[FreeTextEntry1] : #  ER + 100%, [place] + 5%, Her 2 neg  R sided breast cancer T2N1 stage IIB in 2015, s/p neoadjuvant ddAC-T 5/2016, s/p right mastectomy and ALND 6/2016\par - remains on arimadex - would keep for a minimum of 10 years. Continue with ca++1200, vit D 1000 IU\par CA 27 -29 wnl, elevated CEA\par annual mammogram/US ordered for 2/2022\par \par #joint pains - knees\par 2/2 arimadex\par continue tylenol arthritis and voltaren gel\par referred for PT\par \par #lymphedema - referred to PT\par \par #elevated CEA  and loose stools\par last CNY 2018 \par Followed by Dr. Oconnor, EGD/colonoscopy pending 2/2022\par \par #HTN \par better controlled\par \par #tobacco abuse and elevated CEA\par  LD - CT reordered 1/2022\par \par #erythrocytosis/macrocytosis\par folate 2.6 - started on folic acid\par pending iron, ferritin, MPNR, HFE, Epo, flow cytometry, b12/folate\par \par #family history of cancer\par wants to wait for invitae, will decide at next visit.\par \par Case and management discussed with Dr. Hui\par RTC in 3 months with CBC with diff, CMP, Vit D, irons\par review mammo, US, CT chest lung ca screening\par \par

## 2022-01-18 NOTE — HISTORY OF PRESENT ILLNESS
[de-identified] : Ms. Petersen is a 70 year old woman who presents for consultation of right breast cancer.\par She reports getting annual mammograms and they were normal until her annual .\par She was previously seen and treated by Dr Wilcox.; diagnosed T2N1 stage IIB in , s/p neoadjuvant ddAC-T 2016, s/p right mastectomy and ALND 2016\par [place] + 100%, [place] + 5%, Her 2 neg\par Started arimidex 2016, delay in wound healing, wound dehiscence and possible infection\par s/p XRT 2016-2016\par Last vsit with Dr. 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, right thyroid nodule 1.3cm\par 2020 Flow cytometry for erythrocytosis No evidence or immunophenotypic evidence of lymphoproliferative disorder, high grade myelodysplastia, or acute lekemia\par \par She reports numbness to fingers and toes (toes worse), occasionally feels dizzy/lightheaded, taste buds compromised, chronic constipation.\par \par Age of Menarche: 14\par Age of Menopause: 49\par OCP/HRT: denies\par [id number], miscarriage x1\par \par Smoke: started age 20, 2-3 cigarettes per day, quit for a few years, no back to occasional cigarettes due to stress\par ETOH: 1-2 drinks x 2 nights per week\par Illicit: denies\par \par Health Maintenance\par Mammo/US: 2021- normal, not concern\par DEXA: due, last 2018, normal\par Last GYN 2018, due\par PET 2018 unremarkable\par CEA 6.8 (6.2 2021) and Ca 27-29 18.1 (18.9 2021)\par Colonoscopy/EGD last , Dr. 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  She denies nausea, vomiting, and diarrhea.\par She is closely followed by a dermatologist who has her on 40mg PO prednisone daily for previous diffuse rash which is aiding in her joint aches.  She continues on anastrazole. \par

## 2022-01-18 NOTE — CONSULT LETTER
[FreeTextEntry3] : Paola Hui DO, FACBO, FACP\par Medical Oncology and Hematology\par [facility] [place]\par

## 2022-01-30 ENCOUNTER — RESULT REVIEW (OUTPATIENT)
Age: 72
End: 2022-01-30

## 2022-02-01 ENCOUNTER — RESULT REVIEW (OUTPATIENT)
Age: 72
End: 2022-02-01

## 2022-02-02 ENCOUNTER — APPOINTMENT (OUTPATIENT)
Dept: GASTROENTEROLOGY | Facility: HOSPITAL | Age: 72
End: 2022-02-02

## 2022-02-04 ENCOUNTER — NON-APPOINTMENT (OUTPATIENT)
Age: 72
End: 2022-02-04

## 2022-02-14 ENCOUNTER — APPOINTMENT (OUTPATIENT)
Dept: INTERNAL MEDICINE | Facility: CLINIC | Age: 72
End: 2022-02-14
Payer: MEDICARE

## 2022-02-14 VITALS
DIASTOLIC BLOOD PRESSURE: 78 MMHG | HEIGHT: 66 IN | TEMPERATURE: 97.4 F | BODY MASS INDEX: 27.97 KG/M2 | RESPIRATION RATE: 16 BRPM | WEIGHT: 174 LBS | HEART RATE: 75 BPM | SYSTOLIC BLOOD PRESSURE: 142 MMHG | OXYGEN SATURATION: 98 %

## 2022-02-14 PROCEDURE — 99214 OFFICE O/P EST MOD 30 MIN: CPT

## 2022-02-15 ENCOUNTER — APPOINTMENT (OUTPATIENT)
Dept: THORACIC SURGERY | Facility: CLINIC | Age: 72
End: 2022-02-15
Payer: MEDICARE

## 2022-02-15 VITALS — HEIGHT: 66 IN | BODY MASS INDEX: 27.97 KG/M2 | WEIGHT: 174 LBS

## 2022-02-15 PROCEDURE — ACP01: CPT | Mod: NC

## 2022-02-15 NOTE — PLAN
[Smoking Cessation] : smoking cessation [FreeTextEntry1] : Plan:\par \par -Low dose CT chest for lung cancer screening. Dr. Paola Hui ordered the low dose CT.     \par \par -Follow up with patient and her referring provider after her LDCT results have been reviewed by the multidisciplinary clinical team, if needed.\par \par -Encourage continued smoking abstinence.\par \par -Encouraged vaping cessation.\par \par -Patient declines referral to CTC and CCX\par \par Should I screen? tool utilized. 6 Year risk of lung cancer is  2.4 %. \par \par Engaged in discussion regarding risks of screening during Covid-19 pandemic and precautions that are being used  to reduce exposure.\par \par Engaged in shared decision making with Ms. COE . Answered all questions. She verbalized understanding and agreement. She knows to call back with and questions or concerns.\par

## 2022-02-15 NOTE — HISTORY OF PRESENT ILLNESS
[Former] : former smoker [_____ pack-years] : [unfilled] pack-years [TextBox_13] : Referred by Dr. Paola Hui\par \par KRISTI COE  had telephonic visit for a review of eligibility and discussion of the Low dose CT lung cancer screening program. A telephonic visit occurred due to the patient not having access to a smart phone or a computer for an audio/visual visit.  The following was reviewed and confirmed the patient meets screening eligibility criteria.\par -Age 71 year\par Smoking Status:\par -Former smoker\par Smoked 8 cigarettes per day on average for 50 years.\par -Number of pack years smokin\par -Number of years since quitting smokin\par -Quit year: \par \par Ms. COE   denies any signs or symptoms of lung cancer including new cough, changing cough, hemoptysis, and no new unintentional weight loss. \par \par Ms. COE report right breast cancer. She denies any personal history of lung cancer. Reports no lung cancer in a 1st degree relative. Reports no lung cancer in a 2nd degree relative. Denies any history of lung disease. Denies any  history of  occupational exposures. Has no exposure to 2nd hand smoke.\par  [TextBox_10] : 2020

## 2022-02-15 NOTE — ASSESSMENT
[Maintenance] : Maintenance: The patient has quit for more than 6 months [de-identified] : Occasionally vaps non nicotine cartridge. Especially when stressed. Reviewed possible strategies to avoid vaping.  Will consider using Nicorette lozenge PRN in replacement of vaping. Reviewed proper use of lozenge.

## 2022-02-19 NOTE — HISTORY OF PRESENT ILLNESS
[FreeTextEntry1] : Follow iup [de-identified] : Follow up after Multiple Subspecialty appointment.\par She saw Dermatologist: Diagnosis  Eczema.  She was treated on Prednisone taper. Now on Prednisone 10 mg daily\par s/p Hematology evaluation. She was recently diagnosed with Hemochromatosis; H63D heterozygous carrier.  She will call to schedule Phlebotomy. \par She had Moderna x 2;  2/28; 3/28.  and Pfizer Booster 12/19/21

## 2022-02-19 NOTE — REVIEW OF SYSTEMS
[Skin Rash] : skin rash [Fever] : no fever [Chest Pain] : no chest pain [Palpitations] : no palpitations [Shortness Of Breath] : no shortness of breath [Abdominal Pain] : no abdominal pain [Headache] : no headache [Dizziness] : no dizziness [de-identified] : Rash has responded to Oral Steroid.  Less itching and less dryness

## 2022-02-19 NOTE — PHYSICAL EXAM
[No Acute Distress] : no acute distress [No Lymphadenopathy] : no lymphadenopathy [No JVD] : no jugular venous distention [Clear to Auscultation] : lungs were clear to auscultation bilaterally [Normal Rate] : normal rate  [Regular Rhythm] : with a regular rhythm [Normal S1, S2] : normal S1 and S2 [Non Tender] : non-tender [Soft] : abdomen soft [Normal Bowel Sounds] : normal bowel sounds [No CVA Tenderness] : no CVA  tenderness [No Spinal Tenderness] : no spinal tenderness [No Respiratory Distress] : no respiratory distress  [de-identified] : Skin less scaly; noted hyperpigmentation

## 2022-02-28 PROBLEM — Z72.89 ALCOHOL USE: Status: ACTIVE | Noted: 2022-02-28

## 2022-03-01 ENCOUNTER — APPOINTMENT (OUTPATIENT)
Dept: OBGYN | Facility: CLINIC | Age: 72
End: 2022-03-01
Payer: MEDICARE

## 2022-03-01 VITALS
BODY MASS INDEX: 27.16 KG/M2 | HEIGHT: 66 IN | DIASTOLIC BLOOD PRESSURE: 70 MMHG | SYSTOLIC BLOOD PRESSURE: 140 MMHG | WEIGHT: 169 LBS

## 2022-03-01 DIAGNOSIS — Z72.89 OTHER PROBLEMS RELATED TO LIFESTYLE: ICD-10-CM

## 2022-03-01 DIAGNOSIS — R92.2 INCONCLUSIVE MAMMOGRAM: ICD-10-CM

## 2022-03-01 DIAGNOSIS — N95.1 MENOPAUSAL AND FEMALE CLIMACTERIC STATES: ICD-10-CM

## 2022-03-01 DIAGNOSIS — Z01.419 ENCOUNTER FOR GYNECOLOGICAL EXAMINATION (GENERAL) (ROUTINE) W/OUT ABNORMAL FINDINGS: ICD-10-CM

## 2022-03-01 DIAGNOSIS — Z12.31 ENCOUNTER FOR SCREENING MAMMOGRAM FOR MALIGNANT NEOPLASM OF BREAST: ICD-10-CM

## 2022-03-01 PROCEDURE — G0101: CPT

## 2022-03-01 NOTE — PHYSICAL EXAM
[No Lymphadenopathy] : no lymphadenopathy [Soft] : soft [Non-tender] : non-tender [No HSM] : No HSM [No Mass] : no mass [___] : a [unfilled] ~Ucm mastectomy scar [Vulvar Atrophy] : vulvar atrophy [Atrophy] : atrophy [The Left Breast Was Examined] : a normal appearance [Breast Mass Left Breast ___cm] : no mass was palpable [Labia Majora] : normal [Labia Minora] : normal [Normal] : normal [Uterine Adnexae] : normal [FreeTextEntry3] : left thyroid nodule [FreeTextEntry6] : right mastectomy [Enlarged ___ wks] : not enlarged [FreeTextEntry4] : <2 FB introitus [FreeTextEntry5] : Pap done [FreeTextEntry9] : no masses

## 2022-03-01 NOTE — HISTORY OF PRESENT ILLNESS
[Menarche Age: ____] : age at menarche was [unfilled] [Menopause Age: ____] : age at menopause was [unfilled] [TextBox_4] :  - C/S 1985 - daughter. Retired; not very physically active.\par History of right breast cancer. 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 MELVIN Oconnor. Repeat in 5 yrs.

## 2022-03-04 ENCOUNTER — RESULT REVIEW (OUTPATIENT)
Age: 72
End: 2022-03-04

## 2022-03-09 LAB
CYTOLOGY CVX/VAG DOC THIN PREP: NORMAL
HPV HIGH+LOW RISK DNA PNL CVX: NOT DETECTED

## 2022-03-16 ENCOUNTER — APPOINTMENT (OUTPATIENT)
Dept: ENDOCRINOLOGY | Facility: CLINIC | Age: 72
End: 2022-03-16
Payer: MEDICARE

## 2022-03-16 PROCEDURE — 99443: CPT | Mod: 95

## 2022-03-17 NOTE — HISTORY OF PRESENT ILLNESS
[Home] : at home, [unfilled] , at the time of the visit. [Medical Office: ([facility])___] : at the medical office located in  [Verbal consent obtained from patient] : the patient, [unfilled] [FreeTextEntry1] : Mar 16, 2022   telephonic\par \par PCP:   Dr. Lianna Aguilar\par           H/O: Paola Hui    2015 breast cancer \par \par CC:  Thyroid nodule    6/2021 TSH 1.77     calcium 10.5 **    PET CT - thyroid negative  \par                       L adrenal thickening - likely adenoma\par             (folate 2.6)\par             (FBS ~ 110)\par             (very good BD)\par \par Went for a R midpol thyroid nodule biopsy at [place] on 12/15/21 and cytology report was benign ([place] II).  \par \par Imp:  FNAB reassuring.  Even so, surveillance appropriate.\par \par Plan:  Updated labs, US in about six months and ROv after that\par \par \par \par Oct 29, 2021\par \par PCP:   Dr. Lianna Aguilar\par           H/O: Paola Hui    2015 breast cancer \par \par CC:  Thyroid nodule    6/2021 TSH 1.77     calcium 10.5 **    PET CT - thyroid negative  \par                       L adrenal thickening - likely adenoma\par             (folate 2.6)\par             (FBS ~ 110)\par             (very good BD)\par \par Went for a recent ultrasound of the thyroid:\par \par HISTORY: 1.3 cm right thyroid nodule noted on prior outside CTA in 2016\par \par Real-time examination was performed. Prior outside CTA is not available for comparison.\par \par The right thyroid lobe measures 6 x 2.6 x 2.5 cm, the isthmus up to 2.8 mm AP in the midline, and the left thyroid lobe measures 4.2 x 2.2 x 1.8 cm. There is marked diffuse heterogeneity of thyroid parenchymal echogenicity. A lobulated and hyperechoic right mid-lower pole solid nodule (or conglomerate of contiguous nodules) measures 2.5 x 1.7 x 2.4 cm.\par \par \par IMPRESSION: Consideration should be given to ultrasound-guided percutaneous fine-needle aspiration biopsy of the right mid-lower pole nodular area to exclude the possibility of malignancy.\par  \par \par --- End of Report ---\par \par ***Electronically Signed ***\par -----------------------------------------------\par Alvaro Olvera MD              10/26/21\par \par \par On exam, slight fullness R lobe.  Non-tender\par \par \par Plan:  To [place] for FNAB as advised by Rdenmanuel and call me on my cell one week later.\par ROV by March

## 2022-03-29 DIAGNOSIS — Z12.2 ENCOUNTER FOR SCREENING FOR MALIGNANT NEOPLASM OF RESPIRATORY ORGANS: ICD-10-CM

## 2022-04-02 ENCOUNTER — RESULT REVIEW (OUTPATIENT)
Age: 72
End: 2022-04-02

## 2022-04-07 ENCOUNTER — NON-APPOINTMENT (OUTPATIENT)
Age: 72
End: 2022-04-07

## 2022-04-14 ENCOUNTER — NON-APPOINTMENT (OUTPATIENT)
Age: 72
End: 2022-04-14

## 2022-04-18 ENCOUNTER — NON-APPOINTMENT (OUTPATIENT)
Age: 72
End: 2022-04-18

## 2022-04-19 ENCOUNTER — APPOINTMENT (OUTPATIENT)
Dept: HEMATOLOGY ONCOLOGY | Facility: CLINIC | Age: 72
End: 2022-04-19
Payer: MEDICARE

## 2022-04-19 ENCOUNTER — RESULT REVIEW (OUTPATIENT)
Age: 72
End: 2022-04-19

## 2022-04-19 VITALS
WEIGHT: 177 LBS | OXYGEN SATURATION: 94 % | BODY MASS INDEX: 28.79 KG/M2 | RESPIRATION RATE: 16 BRPM | SYSTOLIC BLOOD PRESSURE: 157 MMHG | TEMPERATURE: 98.1 F | HEART RATE: 93 BPM | HEIGHT: 65.91 IN | DIASTOLIC BLOOD PRESSURE: 82 MMHG

## 2022-04-19 DIAGNOSIS — R63.0 ANOREXIA: ICD-10-CM

## 2022-04-19 DIAGNOSIS — R53.83 OTHER FATIGUE: ICD-10-CM

## 2022-04-19 DIAGNOSIS — R20.0 ANESTHESIA OF SKIN: ICD-10-CM

## 2022-04-19 DIAGNOSIS — R20.2 ANESTHESIA OF SKIN: ICD-10-CM

## 2022-04-19 PROCEDURE — 99214 OFFICE O/P EST MOD 30 MIN: CPT | Mod: 25

## 2022-04-19 PROCEDURE — 36415 COLL VENOUS BLD VENIPUNCTURE: CPT

## 2022-04-19 RX ORDER — METHYLPREDNISOLONE 4 MG/1
4 TABLET ORAL
Qty: 1 | Refills: 0 | Status: COMPLETED | COMMUNITY
Start: 2021-12-27 | End: 2022-04-19

## 2022-04-19 RX ORDER — CALCIUM CARBONATE/VITAMIN D3 600 MG-20
600-800 TABLET ORAL
Qty: 60 | Refills: 2 | Status: COMPLETED | COMMUNITY
Start: 2021-04-30 | End: 2022-04-19

## 2022-04-19 RX ORDER — ATORVASTATIN CALCIUM 40 MG/1
40 TABLET, FILM COATED ORAL DAILY
Refills: 0 | Status: COMPLETED | COMMUNITY
Start: 2021-04-27 | End: 2022-04-19

## 2022-04-19 RX ORDER — PREDNISONE 20 MG/1
20 TABLET ORAL
Qty: 30 | Refills: 0 | Status: COMPLETED | COMMUNITY
Start: 2022-01-13 | End: 2022-04-19

## 2022-04-19 RX ORDER — CHROMIUM 200 MCG
25 MCG TABLET ORAL DAILY
Refills: 0 | Status: COMPLETED | COMMUNITY
Start: 2021-06-16 | End: 2022-04-19

## 2022-04-19 RX ORDER — TRIAMCINOLONE ACETONIDE 1 MG/G
0.1 OINTMENT TOPICAL
Qty: 1 | Refills: 3 | Status: COMPLETED | COMMUNITY
Start: 2021-12-27 | End: 2022-04-19

## 2022-04-19 NOTE — PHYSICAL EXAM
[de-identified] : Right sided mastectomy - Left side without masses or axillary adenopathy [de-identified] : patches of hyperpigmentation from previous rash, redness to upper chest and neck, irritation to left breast from scratching

## 2022-04-19 NOTE — ASSESSMENT
[FreeTextEntry1] : #  ER + 100%, [place] + 5%, Her 2 neg  R sided breast cancer T2N1 stage IIB in 2015, s/p neoadjuvant ddAC-T 5/2016, s/p right mastectomy and ALND 6/2016\par - remains on arimadex - would keep for a minimum of 10 years. Continue with ca++1200, vit D 1000 IU\par CA 27 -29 wnl, elevated CEA\par annual mammogram/US ordered for 2/2022\par 4/19/2022 continue anastrazole; mammo reviewed, repeat 12 months reviewed\par \par #joint pains - knees\par 2/2 arimadex\par continue tylenol arthritis and voltaren gel\par referred for PT\par \par #lymphedema - referred to PT\par \par #elevated CEA  and loose stools\par last CNY 2018 \par Followed by Dr. Oconnor, EGD/colonoscopy pending 2/2022\par s/p Dr. Oconnor + CNY 2/2022: polyps and significant diverticulitis \par decreased appetite, told to contact his office as she may need EGD; denies nausea, vomiting, sensation like food gets stuck\par \par #HTN \par better controlled\par \par #tobacco abuse and elevated CEA\par  LD - CT reordered 1/2022\par Chest CT 4/2022: repeat 12 months\par \par #Hemochromatosis carrier, heterozygous H63D\par erythrocytosis/macrocytosis\par folate 2.6 - started on folic acid\par pending iron, ferritin, MPNR, HFE, Epo, flow cytometry, b12/folate\par 4/19/2022 pending MRI liver for iron deposits\par \par #neuropathy\par has appointment pending with rheum\par \par #ongoing skin irritation\par has pending appt with dermatologist Dr. James in 2 weeks \par \par #family history of cancer\par wants to wait for invitae, will continue to discuss\par \par Case and management discussed with Dr. Hui\par RTC in 3 months with CBC with diff, CMP, Vit D, irons\par review mammo, US, CT chest lung ca screening\par  review liver MRI\par

## 2022-04-19 NOTE — REVIEW OF SYSTEMS
[Fever] : no fever [Chills] : no chills [Fatigue] : no fatigue [Recent Change In Weight] : ~T no recent weight change [Eye Pain] : no eye pain [Red Eyes] : eyes not red [FreeTextEntry2] : review of systems completed, negative unless otherwise noted above

## 2022-04-19 NOTE — CONSULT LETTER
[FreeTextEntry3] : Paola Hui DO, FACBO, FACP\par Medical Oncology and Hematology\par [facility] [place]\par

## 2022-04-19 NOTE — HISTORY OF PRESENT ILLNESS
[de-identified] : Ms. Petersen is a 70 year old woman who presents for consultation of right breast cancer.\par She reports getting annual mammograms and they were normal until her annual .\par She was previously seen and treated by Dr Wilcox.; diagnosed T2N1 stage IIB in , s/p neoadjuvant ddAC-T 2016, s/p right mastectomy and ALND 2016\par ER + 100%, [place] + 5%, Her 2 neg\par Started arimidex 2016, delay in wound healing, wound dehiscence and possible infection\par s/p XRT 2016-2016\par Last vsit with Dr. 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, right thyroid nodule 1.3cm\par 2020 Flow cytometry for erythrocytosis No evidence or immunophenotypic evidence of lymphoproliferative disorder, high grade myelodysplastia, or acute lekemia\par \par She reports numbness to fingers and toes (toes worse), occasionally feels dizzy/lightheaded, taste buds compromised, chronic constipation.\par \par Age of Menarche: 14\par Age of Menopause: 49\par OCP/HRT: denies\par [id number], miscarriage x1\par \par Smoke: started age 20, 2-3 cigarettes per day, quit for a few years, no back to occasional cigarettes due to stress\par ETOH: 1-2 drinks x 2 nights per week\par Illicit: denies\par \par Health Maintenance\par Mammo/US: 2021- normal, not concern\par DEXA: due, last 2018, normal\par Last GYN 2018, due\par PET 2018 unremarkable\par CEA 6.8 (6.2 2021) and Ca 27-29 18.1 (18.9 2021)\par Colonoscopy/EGD last , Dr. 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

## 2022-05-05 ENCOUNTER — APPOINTMENT (OUTPATIENT)
Dept: INTERNAL MEDICINE | Facility: CLINIC | Age: 72
End: 2022-05-05
Payer: MEDICARE

## 2022-05-05 VITALS
HEART RATE: 82 BPM | RESPIRATION RATE: 16 BRPM | DIASTOLIC BLOOD PRESSURE: 90 MMHG | WEIGHT: 162 LBS | OXYGEN SATURATION: 98 % | SYSTOLIC BLOOD PRESSURE: 150 MMHG | HEIGHT: 65 IN | BODY MASS INDEX: 26.99 KG/M2 | TEMPERATURE: 99 F

## 2022-05-05 DIAGNOSIS — L30.9 DERMATITIS, UNSPECIFIED: ICD-10-CM

## 2022-05-05 DIAGNOSIS — E87.6 HYPOKALEMIA: ICD-10-CM

## 2022-05-05 DIAGNOSIS — R79.89 OTHER SPECIFIED ABNORMAL FINDINGS OF BLOOD CHEMISTRY: ICD-10-CM

## 2022-05-05 DIAGNOSIS — Z09 ENCOUNTER FOR FOLLOW-UP EXAMINATION AFTER COMPLETED TREATMENT FOR CONDITIONS OTHER THAN MALIGNANT NEOPLASM: ICD-10-CM

## 2022-05-05 PROCEDURE — 99495 TRANSJ CARE MGMT MOD F2F 14D: CPT | Mod: 25

## 2022-05-05 PROCEDURE — 36415 COLL VENOUS BLD VENIPUNCTURE: CPT

## 2022-05-06 ENCOUNTER — TRANSCRIPTION ENCOUNTER (OUTPATIENT)
Age: 72
End: 2022-05-06

## 2022-05-21 LAB
ALBUMIN SERPL ELPH-MCNC: 4 G/DL
ALP BLD-CCNC: 79 U/L
ALT SERPL-CCNC: 8 U/L
ANION GAP SERPL CALC-SCNC: 15 MMOL/L
AST SERPL-CCNC: 16 U/L
BASOPHILS # BLD AUTO: 0.05 K/UL
BASOPHILS NFR BLD AUTO: 0.8 %
BILIRUB SERPL-MCNC: 0.2 MG/DL
BUN SERPL-MCNC: 10 MG/DL
CALCIUM SERPL-MCNC: 9.7 MG/DL
CHLORIDE SERPL-SCNC: 99 MMOL/L
CO2 SERPL-SCNC: 26 MMOL/L
CREAT SERPL-MCNC: 0.93 MG/DL
EGFR: 67 ML/MIN/1.73M2
EOSINOPHIL # BLD AUTO: 0.18 K/UL
EOSINOPHIL NFR BLD AUTO: 2.8 %
GLUCOSE SERPL-MCNC: 107 MG/DL
HCT VFR BLD CALC: 39.7 %
HGB BLD-MCNC: 12.9 G/DL
IMM GRANULOCYTES NFR BLD AUTO: 0.5 %
LYMPHOCYTES # BLD AUTO: 0.63 K/UL
LYMPHOCYTES NFR BLD AUTO: 9.7 %
MAN DIFF?: NORMAL
MCHC RBC-ENTMCNC: 32.1 PG
MCHC RBC-ENTMCNC: 32.5 GM/DL
MCV RBC AUTO: 98.8 FL
MONOCYTES # BLD AUTO: 0.5 K/UL
MONOCYTES NFR BLD AUTO: 7.7 %
NEUTROPHILS # BLD AUTO: 5.13 K/UL
NEUTROPHILS NFR BLD AUTO: 78.5 %
PLATELET # BLD AUTO: 298 K/UL
POTASSIUM SERPL-SCNC: 3.9 MMOL/L
PROT SERPL-MCNC: 7.5 G/DL
RBC # BLD: 4.02 M/UL
RBC # FLD: 12.7 %
SODIUM SERPL-SCNC: 140 MMOL/L
WBC # FLD AUTO: 6.52 K/UL

## 2022-05-25 PROBLEM — R79.89 ELEVATED PLATELET COUNT: Status: ACTIVE | Noted: 2022-05-05

## 2022-05-25 PROBLEM — E87.6 HYPOKALEMIA: Status: ACTIVE | Noted: 2022-05-05

## 2022-05-25 PROBLEM — L30.9 ECZEMA, UNSPECIFIED TYPE: Status: ACTIVE | Noted: 2021-06-16

## 2022-05-25 PROBLEM — Z09 HOSPITAL DISCHARGE FOLLOW-UP: Status: ACTIVE | Noted: 2022-05-05

## 2022-05-25 NOTE — HISTORY OF PRESENT ILLNESS
[Post-hospitalization from ___ Hospital] : Post-hospitalization from [unfilled] Hospital [Admitted on: ___] : The patient was admitted on [unfilled] [Discharged on ___] : discharged on [unfilled] [Discharge Summary] : discharge summary [Pertinent Labs] : pertinent labs [Radiology Findings] : radiology findings [Discharge Med List] : discharge medication list [Patient Contacted By: ____] : and contacted by [unfilled] [FreeTextEntry2] : Patient is a 72 y/o lady h/o HTN, Breast Cancer, diffuse Chronic Pruritic Rash, Thyroid Nodule, Hemochromatosis,  who comes in post discharge from [facility]. She was admitted with chills, sorethroat, diarrhea.  She had had multiple falls at home as well.  Her Admitting Diagnosis was Sepsis; 2/2 Blood Cultures grew Group B Streptococcus.  Bacteremia was thought to be due to breaks in skin from right lower extremity.  \par Chest Xray negative. CT abdomen negative for findings to explain Bacteremia.  Hypokalemia and Hyponatremia were corrected while in hospital. Last Potassium was 3.4.  She was evaluated by the Infectious Disease Service while in Hospital.\par She was treated with Vancomycin and was discharged on Linezolid 500 mg bid x 7 days.\par She was referred to an Infectious Disease Specialist on discharge.\par \par \par Epiceram bid\par Eucrisa daily, oint\par Tacrolimus bid

## 2022-05-25 NOTE — PHYSICAL EXAM
[No Acute Distress] : no acute distress [No JVD] : no jugular venous distention [No Respiratory Distress] : no respiratory distress  [Clear to Auscultation] : lungs were clear to auscultation bilaterally [Normal Rate] : normal rate  [Regular Rhythm] : with a regular rhythm [Normal S1, S2] : normal S1 and S2 [No Murmur] : no murmur heard [Pedal Pulses Present] : the pedal pulses are present [No Edema] : there was no peripheral edema [Soft] : abdomen soft [Non Tender] : non-tender [Normal Bowel Sounds] : normal bowel sounds [de-identified] : Skin is less erythematous and coarse to the touch; decrease scalness

## 2022-05-25 NOTE — REVIEW OF SYSTEMS
[Fever] : no fever [Chills] : no chills [Fatigue] : no fatigue [Chest Pain] : no chest pain [Palpitations] : no palpitations [Shortness Of Breath] : no shortness of breath [Cough] : no cough [Headache] : no headache [Dizziness] : no dizziness [de-identified] : Rash on Skin is improving; she saw Dr. James and new Topical Medications were prescribed

## 2022-07-19 ENCOUNTER — NON-APPOINTMENT (OUTPATIENT)
Age: 72
End: 2022-07-19

## 2022-07-19 ENCOUNTER — APPOINTMENT (OUTPATIENT)
Dept: INTERNAL MEDICINE | Facility: CLINIC | Age: 72
End: 2022-07-19

## 2022-07-19 VITALS
OXYGEN SATURATION: 96 % | TEMPERATURE: 97.2 F | SYSTOLIC BLOOD PRESSURE: 140 MMHG | BODY MASS INDEX: 26.99 KG/M2 | WEIGHT: 162 LBS | HEIGHT: 65 IN | RESPIRATION RATE: 16 BRPM | HEART RATE: 85 BPM | DIASTOLIC BLOOD PRESSURE: 90 MMHG

## 2022-07-19 DIAGNOSIS — Z01.818 ENCOUNTER FOR OTHER PREPROCEDURAL EXAMINATION: ICD-10-CM

## 2022-07-19 PROCEDURE — 93000 ELECTROCARDIOGRAM COMPLETE: CPT

## 2022-07-19 PROCEDURE — 99213 OFFICE O/P EST LOW 20 MIN: CPT | Mod: 25

## 2022-07-20 ENCOUNTER — RESULT REVIEW (OUTPATIENT)
Age: 72
End: 2022-07-20

## 2022-07-20 ENCOUNTER — APPOINTMENT (OUTPATIENT)
Dept: HEMATOLOGY ONCOLOGY | Facility: CLINIC | Age: 72
End: 2022-07-20

## 2022-07-20 VITALS
HEIGHT: 65 IN | HEART RATE: 81 BPM | DIASTOLIC BLOOD PRESSURE: 88 MMHG | BODY MASS INDEX: 27.66 KG/M2 | RESPIRATION RATE: 16 BRPM | WEIGHT: 166 LBS | TEMPERATURE: 97.5 F | SYSTOLIC BLOOD PRESSURE: 138 MMHG | OXYGEN SATURATION: 97 %

## 2022-07-20 PROCEDURE — 36415 COLL VENOUS BLD VENIPUNCTURE: CPT

## 2022-07-20 PROCEDURE — 99214 OFFICE O/P EST MOD 30 MIN: CPT | Mod: 25

## 2022-07-20 NOTE — ASSESSMENT
[FreeTextEntry1] : #  ER + 100%, [place] + 5%, Her 2 neg  R sided breast cancer T2N1 stage IIB in 2015, s/p neoadjuvant ddAC-T 5/2016, s/p right mastectomy and ALND 6/2016\par - remains on arimadex - would keep for a minimum of 10 years. Continue with ca++1200, vit D 1000 IU\par CA 27 -29 wnl, elevated CEA\par annual mammogram/US ordered for 2/2022\par 4/19/2022 continue anastrazole; mammo reviewed, repeat 12 months reviewed\par 7/20/22 -vs and labs reviewed. continue anastrozole. ca++ and vit D\par \par #joint pains - knees\par 2/2 arimadex\par continue tylenol arthritis and voltaren gel\par referred for PT\par \par #lymphedema - referred to PT\par \par #elevated CEA  and loose stools\par last [facility] 2018 \par s/p Dr. Oconnor + CNY 2/2022: polyps and significant diverticulitis - sessile serrated adenoma - needs repeat in 2023\par decreased appetite, told to contact his office as she may need EGD; denies nausea, vomiting, sensation like food gets stuck\par \par #HTN \par better controlled\par \par #tobacco abuse and elevated CEA\par  [facility] - CT reordered 1/2022\par Chest CT 4/2022: repeat 12 months\par \par #Hemochromatosis carrier, heterozygous H63D\par erythrocytosis/macrocytosis\par folate 2.6 - started on folic acid\par still needs MRI liver\par pending iron and ferritin but may need phlebotomy given increase in hgb/hct\par \par #ongoing skin irritation\par seeing dermatologist Dr. James \par \par #family history of breast, ovarian cancer and known family history of vines syndrome\par will check invitae\par \par RTC in 3 months with CBC with diff, CMP, Vit D, irons\par

## 2022-07-20 NOTE — REVIEW OF SYSTEMS
[Negative] : Allergic/Immunologic [Fever] : no fever [Chills] : no chills [Fatigue] : no fatigue [Recent Change In Weight] : ~T no recent weight change [Eye Pain] : no eye pain [Red Eyes] : eyes not red [FreeTextEntry2] : review of systems completed, negative unless otherwise noted above

## 2022-07-20 NOTE — HISTORY OF PRESENT ILLNESS
[de-identified] : Ms. Petersen is a 70 year old woman who presents for consultation of right breast cancer.\par She reports getting annual mammograms and they were normal until her annual .\par She was previously seen and treated by Dr Wilcox.; diagnosed T2N1 stage IIB in , s/p neoadjuvant ddAC-T 2016, s/p right mastectomy and ALND 2016\par ER + 100%, [place] + 5%, Her 2 neg\par Started arimidex 2016, delay in wound healing, wound dehiscence and possible infection\par s/p XRT 2016-2016\par Last vsit with Dr. 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, right thyroid nodule 1.3cm\par 2020 Flow cytometry for erythrocytosis No evidence or immunophenotypic evidence of lymphoproliferative disorder, high grade myelodysplastia, or acute lekemia\par \par She reports numbness to fingers and toes (toes worse), occasionally feels dizzy/lightheaded, taste buds compromised, chronic constipation.\par \par Age of Menarche: 14\par Age of Menopause: 49\par OCP/HRT: denies\par [id number], miscarriage x1\par \par Smoke: started age 20, 2-3 cigarettes per day, quit for a few years, no back to occasional cigarettes due to stress\par ETOH: 1-2 drinks x 2 nights per week\par Illicit: denies\par \par Health Maintenance\par Mammo/US: 2021- normal, not concern\par DEXA: due, last 2018, normal\par Last GYN 2018, due\par PET 2018 unremarkable\par CEA 6.8 (6.2 2021) and Ca 27-29 18.1 (18.9 2021)\par Colonoscopy/EGD last , Dr. 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

## 2022-07-20 NOTE — CONSULT LETTER
[Dear  ___] : Dear  [unfilled], [Consult Letter:] : I had the pleasure of evaluating your patient, [unfilled]. [Please see my note below.] : Please see my note below. [Consult Closing:] : Thank you very much for allowing me to participate in the care of this patient.  If you have any questions, please do not hesitate to contact me. [Sincerely,] : Sincerely, [FreeTextEntry3] : Paola Hui DO, FACBO, FACP\par Medical Oncology and Hematology\par [facility] [place]\par

## 2022-07-20 NOTE — PHYSICAL EXAM
[Fully active, able to carry on all pre-disease performance without restriction] : Status 0 - Fully active, able to carry on all pre-disease performance without restriction [Normal] : affect appropriate [de-identified] : Right sided mastectomy - Left side without masses or axillary adenopathy [de-identified] : patches of hyperpigmentation from previous rash, redness to upper chest and neck, irritation to left breast from scratching

## 2022-07-27 ENCOUNTER — APPOINTMENT (OUTPATIENT)
Dept: PODIATRY | Facility: CLINIC | Age: 72
End: 2022-07-27

## 2022-07-27 ENCOUNTER — NON-APPOINTMENT (OUTPATIENT)
Age: 72
End: 2022-07-27

## 2022-07-27 VITALS — WEIGHT: 166 LBS | BODY MASS INDEX: 27.66 KG/M2 | HEIGHT: 65 IN

## 2022-07-27 DIAGNOSIS — M20.12 HALLUX VALGUS (ACQUIRED), LEFT FOOT: ICD-10-CM

## 2022-07-27 DIAGNOSIS — M20.11 HALLUX VALGUS (ACQUIRED), RIGHT FOOT: ICD-10-CM

## 2022-07-27 PROCEDURE — 99203 OFFICE O/P NEW LOW 30 MIN: CPT

## 2022-07-27 NOTE — REVIEW OF SYSTEMS
[Joint Swelling] : joint swelling [Arthralgias] : arthralgias [Joint Stiffness] : joint stiffness [As Noted in HPI] : as noted in HPI [Negative] : Heme/Lymph [Limb Pain] : no limb pain [Limb Swelling] : no limb swelling [Limb Weakness] : no limb weakness [Difficulty Walking] : no difficulty walking

## 2022-07-27 NOTE — PROCEDURE
[FreeTextEntry1] : HAV:\par had a lengthy discussion with the patient regarding the diagnosis etiology and differential diagnosis as well as treatment options for the presenting problem. Risks alternatives and benefits of treatment ranging from conservative to surgical explained in great detail. I also explained the progression of treatment from conservative to possible surgical treatment options as well as the benefits of each. I do stress conservative treatment if in fact conservative treatment is an option until it no longer provides relief. Over-the-counter products medications padding, and splinting were reviewed as well. All questions asked and answered appropriately to the patient's satisfaction\par A complete and thorough evaluation of the type of shoes they should be wearing and type of shoes for this time of year was discussed with patient.\par \par During the evaluation and management I had a lengthy discussion with the patient regarding benefits of functional foot orthoses. I explained to the patient the etiology and treatment options and one of them included the offloading and balancing of the painful portion of the foot. I explained the importance of balancing in offloading the painful area as part of the overall treatment process to advance healing. I have asked the patient to consider this as part of the treatment\par \par Neuropathy:\par Based on my physical examination and my clinical findings and the patient's description of the symptoms, a complete differential diagnosis was reviewed with the patient. Possible diagnoses as well as treatment options explained in great detail. All questions asked and answered appropriately.\par had a lengthy discussion with the patient regarding the diagnosis etiology and differential diagnosis as well as treatment options for the presenting problem. Risks alternatives and benefits of treatment ranging from conservative to surgical explained in great detail.\par I had a lengthy d/w the patient re: the use and benefits of various vitamins as a dietary supplement which can help treat their current condition. Educational literature supplied and all questions asked and answered appropriately. Suggestions of the type of vitamins recommended dispensed and advised to use on a consistent basis\par A lengthy discussion with the patient regarding the current medication being prescribed, the dosage, frequency, and the need to be consistent with taking the medication at or about the same time every day. I also spent time reviewing the risks alternatives and benefits to the medication as well as the potential side effects. The decision was made to proceed with the medication\par \par neurology Consult\par

## 2022-07-27 NOTE — PHYSICAL EXAM
[General Appearance - Alert] : alert [General Appearance - In No Acute Distress] : in no acute distress [Skin Color & Pigmentation] : normal skin color and pigmentation [Skin Turgor] : normal skin turgor [] : no rash [Skin Lesions] : no skin lesions [Vibration Dec.] : diminished vibratory sensation at the level of the toes [Oriented To Time, Place, And Person] : oriented to person, place, and time [Impaired Insight] : insight and judgment were intact [Affect] : the affect was normal [de-identified] : the patient has a moderate-to-severe hallux valgus deformity, with an inflamed and erythematous medial eminence, decreased range of motion dorsiflexion and plantar flexion, pain upon palpation of the area with tracking noted. The patient admits to pain in all types of shoe gear both "dress" and casual, and has noticed increase in both pain and deformity. There's decreased range of motion only approximately 30-45° before they complain of increased pain. They also have decreased plantar flexion and does admit to pain along the extensor tendon both feet with 2nd toe involvement\par \par  [Foot Ulcer] : no foot ulcer [Skin Induration] : no skin induration [Position Sense Dec.] : normal position sense at the level of the toes [Diminished Throughout Right Foot] : normal sensation with monofilament testing throughout right foot [Diminished Throughout Left Foot] : normal sensation with monofilament testing throughout left foot [FreeTextEntry1] : more distally to the plantar aspect of the feet and the digits

## 2022-07-27 NOTE — REASON FOR VISIT
[Initial Visit] : an initial visit for [Foot Deformity] : foot deformity [Foot Pain] : foot pain [Other:___] : [unfilled] [FreeTextEntry2] : both

## 2022-07-27 NOTE — HISTORY OF PRESENT ILLNESS
[FreeTextEntry1] : Location: numbness and tingling to all toes and both feet\par Duration: "Oh God I don't know, quite a while"\par Etiology: possibilities explained\par Past Tx: none\par \par HAV\par Location: both feet\par Duration: many years\par Etiology: unknown\par Past Tx: shoe alteration\par Exacerbated by: shoe\par \par

## 2022-07-29 ENCOUNTER — APPOINTMENT (OUTPATIENT)
Dept: PODIATRY | Facility: CLINIC | Age: 72
End: 2022-07-29

## 2022-08-24 ENCOUNTER — APPOINTMENT (OUTPATIENT)
Dept: INTERNAL MEDICINE | Facility: CLINIC | Age: 72
End: 2022-08-24

## 2022-08-24 VITALS
DIASTOLIC BLOOD PRESSURE: 86 MMHG | HEART RATE: 75 BPM | OXYGEN SATURATION: 98 % | HEIGHT: 65 IN | RESPIRATION RATE: 16 BRPM | TEMPERATURE: 98.6 F | BODY MASS INDEX: 27.66 KG/M2 | WEIGHT: 166 LBS | SYSTOLIC BLOOD PRESSURE: 140 MMHG

## 2022-08-24 DIAGNOSIS — E83.52 HYPERCALCEMIA: ICD-10-CM

## 2022-08-24 DIAGNOSIS — E04.1 NONTOXIC SINGLE THYROID NODULE: ICD-10-CM

## 2022-08-24 PROCEDURE — 99213 OFFICE O/P EST LOW 20 MIN: CPT | Mod: 25

## 2022-08-24 PROCEDURE — 36415 COLL VENOUS BLD VENIPUNCTURE: CPT

## 2022-09-05 PROBLEM — E04.1 THYROID NODULE: Status: ACTIVE | Noted: 2021-04-27

## 2022-09-09 LAB
ALBUMIN SERPL ELPH-MCNC: 4.2 G/DL
ALP BLD-CCNC: 79 U/L
ALT SERPL-CCNC: 10 U/L
ANION GAP SERPL CALC-SCNC: 17 MMOL/L
AST SERPL-CCNC: 14 U/L
BASOPHILS # BLD AUTO: 0.05 K/UL
BASOPHILS NFR BLD AUTO: 0.8 %
BILIRUB SERPL-MCNC: 1.1 MG/DL
BUN SERPL-MCNC: 13 MG/DL
CALCIUM SERPL-MCNC: 10.2 MG/DL
CALCIUM SERPL-MCNC: 10.2 MG/DL
CHLORIDE SERPL-SCNC: 104 MMOL/L
CO2 SERPL-SCNC: 21 MMOL/L
CREAT SERPL-MCNC: 0.84 MG/DL
EGFR: 75 ML/MIN/1.73M2
EOSINOPHIL # BLD AUTO: 0.18 K/UL
EOSINOPHIL NFR BLD AUTO: 2.9 %
FERRITIN SERPL-MCNC: 138 NG/ML
GLUCOSE SERPL-MCNC: 112 MG/DL
HCT VFR BLD CALC: 52.4 %
HGB BLD-MCNC: 17.3 G/DL
IMM GRANULOCYTES NFR BLD AUTO: 0.3 %
IRON SATN MFR SERPL: 61 %
IRON SERPL-MCNC: 214 UG/DL
LYMPHOCYTES # BLD AUTO: 0.58 K/UL
LYMPHOCYTES NFR BLD AUTO: 9.5 %
MAN DIFF?: NORMAL
MCHC RBC-ENTMCNC: 33 GM/DL
MCHC RBC-ENTMCNC: 33 PG
MCV RBC AUTO: 99.8 FL
MONOCYTES # BLD AUTO: 0.56 K/UL
MONOCYTES NFR BLD AUTO: 9.2 %
NEUTROPHILS # BLD AUTO: 4.73 K/UL
NEUTROPHILS NFR BLD AUTO: 77.3 %
PARATHYROID HORMONE INTACT: 32 PG/ML
PLATELET # BLD AUTO: 191 K/UL
POTASSIUM SERPL-SCNC: 4.4 MMOL/L
PROT SERPL-MCNC: 7.3 G/DL
RBC # BLD: 5.25 M/UL
RBC # FLD: 13.5 %
SODIUM SERPL-SCNC: 141 MMOL/L
TIBC SERPL-MCNC: 349 UG/DL
UIBC SERPL-MCNC: 135 UG/DL
WBC # FLD AUTO: 6.12 K/UL

## 2022-10-03 ENCOUNTER — APPOINTMENT (OUTPATIENT)
Dept: INTERNAL MEDICINE | Facility: CLINIC | Age: 72
End: 2022-10-03

## 2022-10-19 NOTE — ASSESSMENT
[FreeTextEntry1] : #  ER + 100%, [place] + 5%, Her 2 neg  R sided breast cancer T2N1 stage IIB in 2015, s/p neoadjuvant ddAC-T 5/2016, s/p right mastectomy and ALND 6/2016\par - remains on arimadex - would keep for a minimum of 10 years. Continue with ca++1200, vit D 1000 IU\par CA 27 -29 wnl, elevated CEA\par annual mammogram/US ordered for 2/2022\par 4/19/2022 continue anastrazole; mammo reviewed, repeat 12 months reviewed\par 7/20/22 -vs and labs reviewed. continue anastrozole. ca++ and vit D\par \par #joint pains - knees\par 2/2 arimadex\par continue tylenol arthritis and voltaren gel\par referred for PT\par \par #lymphedema - referred to PT\par \par #elevated CEA  and loose stools\par last [facility] 2018 \par s/p Dr. Oconnor + CNY 2/2022: polyps and significant diverticulitis - sessile serrated adenoma - needs repeat in 2023\par decreased appetite, told to contact his office as she may need EGD; denies nausea, vomiting, sensation like food gets stuck\par \par #HTN \par better controlled\par \par #tobacco abuse and elevated CEA\par  [place] - CT reordered 1/2022\par Chest CT 4/2022: repeat 12 months\par \par #Hemochromatosis carrier, heterozygous H63D\par erythrocytosis/macrocytosis\par folate 2.6 - started on folic acid\par still needs MRI liver\par pending iron and ferritin but may need phlebotomy given increase in hgb/hct\par \par #ongoing skin irritation\par seeing dermatologist Dr. James \par \par #family history of breast, ovarian cancer and known family history of vines syndrome\par will check invitae\par \par RTC in 3 months with CBC with diff, CMP, Vit D, irons\par

## 2022-10-19 NOTE — REVIEW OF SYSTEMS
[Fever] : no fever [Chills] : no chills [Fatigue] : no fatigue [Recent Change In Weight] : ~T no recent weight change [Eye Pain] : no eye pain [Red Eyes] : eyes not red [Negative] : Allergic/Immunologic [FreeTextEntry2] : review of systems completed, negative unless otherwise noted above

## 2022-10-19 NOTE — CONSULT LETTER
[Dear  ___] : Dear  [unfilled], [Consult Letter:] : I had the pleasure of evaluating your patient, [unfilled]. [Please see my note below.] : Please see my note below. [Consult Closing:] : Thank you very much for allowing me to participate in the care of this patient.  If you have any questions, please do not hesitate to contact me. [Sincerely,] : Sincerely, [FreeTextEntry3] : Paola Hui DO, FACBO, FACP\par Medical Oncology and Hematology\par [facility] [place]\par

## 2022-10-19 NOTE — PHYSICAL EXAM
[Fully active, able to carry on all pre-disease performance without restriction] : Status 0 - Fully active, able to carry on all pre-disease performance without restriction [Normal] : affect appropriate [de-identified] : Right sided mastectomy - Left side without masses or axillary adenopathy [de-identified] : patches of hyperpigmentation from previous rash, redness to upper chest and neck, irritation to left breast from scratching

## 2022-10-26 ENCOUNTER — APPOINTMENT (OUTPATIENT)
Dept: HEMATOLOGY ONCOLOGY | Facility: CLINIC | Age: 72
End: 2022-10-26

## 2022-11-28 DIAGNOSIS — Z14.8 GENETIC CARRIER OF OTHER DISEASE: ICD-10-CM

## 2022-11-28 DIAGNOSIS — R79.89 OTHER SPECIFIED ABNORMAL FINDINGS OF BLOOD CHEMISTRY: ICD-10-CM

## 2022-12-07 ENCOUNTER — RESULT REVIEW (OUTPATIENT)
Age: 72
End: 2022-12-07

## 2022-12-07 ENCOUNTER — APPOINTMENT (OUTPATIENT)
Dept: HEMATOLOGY ONCOLOGY | Facility: CLINIC | Age: 72
End: 2022-12-07

## 2022-12-07 VITALS
BODY MASS INDEX: 29.69 KG/M2 | RESPIRATION RATE: 16 BRPM | DIASTOLIC BLOOD PRESSURE: 86 MMHG | TEMPERATURE: 98 F | SYSTOLIC BLOOD PRESSURE: 180 MMHG | HEIGHT: 65 IN | OXYGEN SATURATION: 98 % | WEIGHT: 178.2 LBS | HEART RATE: 71 BPM

## 2022-12-07 DIAGNOSIS — G62.9 POLYNEUROPATHY, UNSPECIFIED: ICD-10-CM

## 2022-12-07 DIAGNOSIS — Z85.3 PERSONAL HISTORY OF MALIGNANT NEOPLASM OF BREAST: ICD-10-CM

## 2022-12-07 PROCEDURE — 36415 COLL VENOUS BLD VENIPUNCTURE: CPT

## 2022-12-07 PROCEDURE — 99213 OFFICE O/P EST LOW 20 MIN: CPT | Mod: 25

## 2022-12-07 RX ORDER — FOLIC ACID 1 MG/1
1 TABLET ORAL
Qty: 90 | Refills: 0 | Status: COMPLETED | COMMUNITY
Start: 2021-10-28 | End: 2022-12-07

## 2022-12-07 RX ORDER — EMOLLIENT COMBINATION NO.32
EMULSION, EXTENDED RELEASE TOPICAL
Refills: 0 | Status: COMPLETED | COMMUNITY
Start: 2022-05-25 | End: 2022-12-07

## 2022-12-07 RX ORDER — CRISABOROLE 20 MG/G
2 OINTMENT TOPICAL
Refills: 0 | Status: COMPLETED | COMMUNITY
Start: 2022-05-25 | End: 2022-12-07

## 2022-12-07 RX ORDER — FLUTICASONE PROPIONATE 0.05 MG/G
0.01 OINTMENT TOPICAL
Qty: 30 | Refills: 0 | Status: COMPLETED | COMMUNITY
Start: 2022-03-10 | End: 2022-12-07

## 2022-12-07 NOTE — ASSESSMENT
[FreeTextEntry1] : #  ER + 100%, [place] + 5%, Her 2 neg  R sided breast cancer T2N1 stage IIB in 2015, s/p neoadjuvant ddAC-T 5/2016, s/p right mastectomy and ALND 6/2016\par - remains on arimadex - would keep for a minimum of 10 years. Continue with ca++1200, vit D 1000 IU\par CA 27 -29 wnl, elevated CEA\par annual mammogram/US ordered for 2/2022\par 4/19/2022 continue anastrazole; mammo reviewed, repeat 12 months reviewed\par 7/20/22 -vs and labs reviewed. continue anastrozole. ca++ and vit D\par 12/7/2022 vs and CBC reviewed; WBC 5.3, hgb 15.2, plt 233; continue anastrazole; left mammo/US ordered for 3/2023\par \par #joint pains - knees\par 2/2 arimadex\par continue tylenol arthritis and voltaren gel\par referred for PT\par \par #elevated CEA  and loose stools\par last [facility]Y 2018 \par s/p Dr. Oconnor + CNY 2/2022: polyps and significant diverticulitis - sessile serrated adenoma - needs repeat in 2023\par decreased appetite, told to contact his office as she may need EGD; denies nausea, vomiting, sensation like food gets stuck\par \par #HTN \par better controlled\par \par #tobacco abuse and elevated CEA\par  [facility] - CT reordered 1/2022\par Chest CT 4/2022: repeat 12 months\par 12/7/2022 next due 4/2023\par \par #Hemochromatosis carrier, heterozygous H63D\par erythrocytosis/macrocytosis\par folate 2.6 - started on folic acid\par still needs MRI liver\par pending iron and ferritin but may need phlebotomy given increase in hgb/hct\par 12/7/2022 irons pending; reminded to obtain MRI abd \par \par #ongoing skin irritation\par seeing dermatologist Dr. James \par \par #family history of breast, ovarian cancer and known family history of vines syndrome\par \par Case and management discussed with Dr. Hui\par RTC in 40 months with CBC with diff, CMP, Vit D, irons, B12, folate\par

## 2022-12-07 NOTE — CONSULT LETTER
[Dear  ___] : Dear  [unfilled], [Consult Letter:] : I had the pleasure of evaluating your patient, [unfilled]. [Please see my note below.] : Please see my note below. [Consult Closing:] : Thank you very much for allowing me to participate in the care of this patient.  If you have any questions, please do not hesitate to contact me. [Sincerely,] : Sincerely, [FreeTextEntry3] : Paola Hui DO, FACBO, FACP\par Medical Oncology and Hematology\par [facility] [place]\par

## 2022-12-07 NOTE — HISTORY OF PRESENT ILLNESS
[de-identified] : Ms. Petersen is a 70 year old woman who presents for consultation of right breast cancer.\par She reports getting annual mammograms and they were normal until her annual .\par She was previously seen and treated by Dr Wilcox.; diagnosed T2N1 stage IIB in , s/p neoadjuvant ddAC-T 2016, s/p right mastectomy and ALND 2016\par ER + 100%, CA + 5%, Her 2 neg\par Started arimidex 2016, delay in wound healing, wound dehiscence and possible infection\par s/p XRT 2016-2016\par Last vsit with Dr. 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, right thyroid nodule 1.3cm\par 2020 Flow cytometry for erythrocytosis No evidence or immunophenotypic evidence of lymphoproliferative disorder, high grade myelodysplastia, or acute lekemia\par \par She reports numbness to fingers and toes (toes worse), occasionally feels dizzy/lightheaded, taste buds compromised, chronic constipation.\par \par Age of Menarche: 14\par Age of Menopause: 49\par OCP/HRT: denies\par [id number], miscarriage x1\par \par Smoke: started age 20, 2-3 cigarettes per day, quit for a few years, no back to occasional cigarettes due to stress\par ETOH: 1-2 drinks x 2 nights per week\par Illicit: denies\par \par Health Maintenance\par Mammo/US: 2021- normal, not concern\par DEXA: due, last 2018, normal\par Last GYN 2018, due\par PET 2018 unremarkable\par CEA 6.8 (6.2 2021) and Ca 27-29 18.1 (18.9 2021)\par Colonoscopy/EGD last , Dr. 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  \par She continues on anastrazole without any issues.\par

## 2022-12-07 NOTE — PHYSICAL EXAM
[Fully active, able to carry on all pre-disease performance without restriction] : Status 0 - Fully active, able to carry on all pre-disease performance without restriction [Normal] : normal appearance, no rash, nodules, vesicles, ulcers, erythema [de-identified] : Right sided mastectomy - Left side without masses or axillary adenopathy

## 2022-12-16 ENCOUNTER — APPOINTMENT (OUTPATIENT)
Dept: NEUROLOGY | Facility: CLINIC | Age: 72
End: 2022-12-16

## 2022-12-16 VITALS
SYSTOLIC BLOOD PRESSURE: 165 MMHG | WEIGHT: 176 LBS | HEART RATE: 82 BPM | HEIGHT: 65 IN | OXYGEN SATURATION: 98 % | DIASTOLIC BLOOD PRESSURE: 95 MMHG | BODY MASS INDEX: 29.32 KG/M2

## 2022-12-16 DIAGNOSIS — Z74.09 OTHER REDUCED MOBILITY: ICD-10-CM

## 2022-12-16 DIAGNOSIS — Z80.7 FAMILY HISTORY OF OTHER MALIGNANT NEOPLASMS OF LYMPHOID, HEMATOPOIETIC AND RELATED TISSUES: ICD-10-CM

## 2022-12-16 PROCEDURE — 99204 OFFICE O/P NEW MOD 45 MIN: CPT

## 2022-12-16 RX ORDER — PREDNISOLONE ACETATE 10 MG/ML
1 SUSPENSION/ DROPS OPHTHALMIC
Qty: 5 | Refills: 0 | Status: DISCONTINUED | COMMUNITY
Start: 2022-07-21 | End: 2022-12-16

## 2022-12-16 RX ORDER — ACETAZOLAMIDE 125 MG/1
125 TABLET ORAL
Qty: 15 | Refills: 0 | Status: DISCONTINUED | COMMUNITY
Start: 2022-07-21 | End: 2022-12-16

## 2022-12-16 RX ORDER — MOXIFLOXACIN OPHTHALMIC 5 MG/ML
0.5 SOLUTION/ DROPS OPHTHALMIC
Qty: 3 | Refills: 0 | Status: DISCONTINUED | COMMUNITY
Start: 2022-07-21 | End: 2022-12-16

## 2022-12-16 NOTE — ASSESSMENT
[FreeTextEntry1] : Anna Larson is a 72 year old woman with a history of breast cancer presenting with a peripheral neuropathy possibly secondary to chemotherapy. \par \par  Serological workup to look for any other identifiable causes.   \par PT referral for gait and balance. \par Advised against B complex. An elevated B6 can cause further damage to nerves.\par Daily foot exams. \par She is not currently in pain-unclear if Gabapentin is still needed for any other reason. She has not been taking it consistently. \par Continue with podiatry follow up. \par EMG/NCV legs. \par \par Follow up in 3 months.

## 2022-12-16 NOTE — PHYSICAL EXAM
[FreeTextEntry1] : Physical examination \par General: No acute distress, Awake, Alert.   \par  \par Mental status \par Awake, alert, and oriented to person, time and place, Normal attention span and concentration, Recent and remote memory intact, Language intact, Fund of knowledge intact.   \par \par Cranial Nerves \par II: VFF  \par III, IV, VI: PERRL, EOMI.   \par V: Facial sensation is normal B/L.   \par VII: Facial strength is normal B/L. \par \par \par VIII: Gross hearing is intact.   \par  \par \par IX, X: Palate is midline and elevates symmetrically.   \par XI: Trapezius normal strength.   \par XII: Tongue midline without atrophy or fasciculations. \par \par Motor exam  \par Muscle tone - no evidence of rigidity or resistance in all 4 extremities.  \par No atrophy or fasciculations \par Muscle Strength: arms and legs, proximal and distal flexors and extensors are normal \par \par No UE drift.\par \par Reflexes\par 2+ \par Absent ankles  \par \par Coordination \par Finger to nose: Normal.  \par Heel to shin: Normal.   \par  \par \par Sensory \par Intact sensation to PP and proprioception. \par Decreased vibration MM B.\par Decreased cold up to ankles B. \par  \par  \par Gait \par  difficulty with tandem\par steppage gait\par able to do heel, toe \par unsteady.\par \par Positive Romberg. \par \par

## 2022-12-16 NOTE — HISTORY OF PRESENT ILLNESS
[FreeTextEntry1] : Anna Larson is a 72 year old woman with a history of breast cancer 2016 with chemotherapy, radiation s/p mastectomy, hemochromatosis, cataract surgery,  presenting for a consultation for numbness in both feet. \par \par The  numbness in both feet to the soles and by her toes started two years or more and has been stable since that time. Denies pain. Denies burning sensation. Denies back pain down the leg. Denies numbness or tingling in her fingers. No aggravating or relieving factors. There is  no pain that wakes her up at night. She is currently taking Gabapentin 300 mg nightly. \par \par She also reports knee pain. The knee pain started last year and notes a worsening when going up and down the steps. The discomfort resolves once she stops moving. She describes an aching sensation that is partially relieved with the Gabapentin that she is taking. Denies multiple joint pains. She feels unsteady when walking. No falls or injuries. Denies bowel or bladder difficulties. Ms. Larson did PT last year, not sure if there was any improvement. \par \par  \par Social history: no smoke or 2 drinks occasionally \par \par Family history: \par sister- Multiple myeloma, DM\par Brother- DM .\par Mother- Alzheimers \par \par The remaining neurological review of systems is negative. \par

## 2023-01-03 ENCOUNTER — APPOINTMENT (OUTPATIENT)
Dept: INTERNAL MEDICINE | Facility: CLINIC | Age: 73
End: 2023-01-03
Payer: MEDICARE

## 2023-01-03 DIAGNOSIS — U07.1 COVID-19: ICD-10-CM

## 2023-01-03 PROCEDURE — 99213 OFFICE O/P EST LOW 20 MIN: CPT | Mod: CS

## 2023-01-03 RX ORDER — GABAPENTIN 300 MG/1
300 CAPSULE ORAL
Qty: 90 | Refills: 0 | Status: DISCONTINUED | COMMUNITY
Start: 2022-07-27 | End: 2023-01-03

## 2023-01-04 PROBLEM — U07.1 COVID-19 VIRUS INFECTION: Status: ACTIVE | Noted: 2023-01-03

## 2023-01-04 NOTE — REVIEW OF SYSTEMS
[Sore Throat] : sore throat [Cough] : cough [Fever] : no fever [Chills] : no chills [Night Sweats] : no night sweats [Vision Problems] : no vision problems [Nasal Discharge] : no nasal discharge [Postnasal Drip] : no postnasal drip [Chest Pain] : no chest pain [Palpitations] : no palpitations [Shortness Of Breath] : no shortness of breath [Wheezing] : no wheezing [Abdominal Pain] : no abdominal pain [Nausea] : no nausea [Constipation] : no constipation [Diarrhea] : diarrhea [Vomiting] : no vomiting [Heartburn] : no heartburn [Hematuria] : no hematuria [Frequency] : no frequency [FreeTextEntry7] : No blood in stool

## 2023-01-04 NOTE — PHYSICAL EXAM
[No Acute Distress] : no acute distress [Normal Voice/Communication] : normal voice/communication [No Respiratory Distress] : no respiratory distress  [de-identified] : Telephone call.  Patient speaks comfortably and is pleasant

## 2023-01-04 NOTE — HISTORY OF PRESENT ILLNESS
[Moderate] : moderate [___ Days ago] : [unfilled] days ago [Constant] : constant [Cough] : cough [Sore Throat] : sore throat [OTC Remedies] : OTC remedies [Stable] : stable [Congestion] : no congestion [Wheezing] : no wheezing [Anorexia] : no anorexia [Shortness Of Breath] : no shortness of breath [Earache] : no earache [Fatigue] : not fatigue [Headache] : no headache [Fever] : no fever [FreeTextEntry5] : tea [FreeTextEntry8] : Patient with second day of cough and sore throat.  She has no fever, shortness of breath or GI symptoms.  She feels better with taking tea.  She has not taken any other cold medications yet.  She had a positive COVID test today.

## 2023-01-05 ENCOUNTER — APPOINTMENT (OUTPATIENT)
Dept: INTERNAL MEDICINE | Facility: CLINIC | Age: 73
End: 2023-01-05
Payer: MEDICARE

## 2023-01-05 DIAGNOSIS — U07.1 COVID-19: ICD-10-CM

## 2023-01-19 ENCOUNTER — NON-APPOINTMENT (OUTPATIENT)
Age: 73
End: 2023-01-19

## 2023-02-07 ENCOUNTER — LABORATORY RESULT (OUTPATIENT)
Age: 73
End: 2023-02-07

## 2023-02-07 ENCOUNTER — NON-APPOINTMENT (OUTPATIENT)
Age: 73
End: 2023-02-07

## 2023-02-07 ENCOUNTER — APPOINTMENT (OUTPATIENT)
Dept: INTERNAL MEDICINE | Facility: CLINIC | Age: 73
End: 2023-02-07
Payer: MEDICARE

## 2023-02-07 VITALS
HEART RATE: 78 BPM | HEIGHT: 65 IN | BODY MASS INDEX: 28.32 KG/M2 | DIASTOLIC BLOOD PRESSURE: 76 MMHG | RESPIRATION RATE: 16 BRPM | TEMPERATURE: 97.9 F | WEIGHT: 170 LBS | OXYGEN SATURATION: 97 % | SYSTOLIC BLOOD PRESSURE: 132 MMHG

## 2023-02-07 DIAGNOSIS — E55.9 VITAMIN D DEFICIENCY, UNSPECIFIED: ICD-10-CM

## 2023-02-07 DIAGNOSIS — E27.8 OTHER SPECIFIED DISORDERS OF ADRENAL GLAND: ICD-10-CM

## 2023-02-07 DIAGNOSIS — E21.3 HYPERPARATHYROIDISM, UNSPECIFIED: ICD-10-CM

## 2023-02-07 DIAGNOSIS — Z00.00 ENCOUNTER FOR GENERAL ADULT MEDICAL EXAMINATION W/OUT ABNORMAL FINDINGS: ICD-10-CM

## 2023-02-07 PROCEDURE — 36415 COLL VENOUS BLD VENIPUNCTURE: CPT

## 2023-02-07 PROCEDURE — G0439: CPT

## 2023-02-07 PROCEDURE — 93000 ELECTROCARDIOGRAM COMPLETE: CPT

## 2023-02-07 PROCEDURE — 99214 OFFICE O/P EST MOD 30 MIN: CPT | Mod: 25

## 2023-02-09 ENCOUNTER — NON-APPOINTMENT (OUTPATIENT)
Age: 73
End: 2023-02-09

## 2023-02-10 LAB
ANA PAT FLD IF-IMP: ABNORMAL
ANA SER IF-ACNC: ABNORMAL
ERYTHROCYTE [SEDIMENTATION RATE] IN BLOOD BY WESTERGREN METHOD: 9 MM/HR
ESTIMATED AVERAGE GLUCOSE: 108 MG/DL
HBA1C MFR BLD HPLC: 5.4 %
RHEUMATOID FACT SER QL: <10 IU/ML
TSH SERPL-ACNC: 0.46 UIU/ML
TTG IGA SER IA-ACNC: 1.2 U/ML
TTG IGA SER-ACNC: NEGATIVE
TTG IGG SER IA-ACNC: 3.6 U/ML
TTG IGG SER IA-ACNC: NEGATIVE
VIT B1 SERPL-MCNC: 81.1 NMOL/L
VIT B12 SERPL-MCNC: 492 PG/ML

## 2023-02-13 PROBLEM — E27.8 ADRENAL NODULE: Status: ACTIVE | Noted: 2021-10-29

## 2023-02-13 PROBLEM — E21.3 HYPERPARATHYROIDISM: Status: ACTIVE | Noted: 2021-10-29

## 2023-02-13 LAB
ALBUMIN MFR SERPL ELPH: 52.7 %
ALBUMIN SERPL-MCNC: 4.2 G/DL
ALBUMIN/GLOB SERPL: 1.1 RATIO
ALPHA1 GLOB MFR SERPL ELPH: 4.7 %
ALPHA1 GLOB SERPL ELPH-MCNC: 0.4 G/DL
ALPHA2 GLOB MFR SERPL ELPH: 10 %
ALPHA2 GLOB SERPL ELPH-MCNC: 0.8 G/DL
B-GLOBULIN MFR SERPL ELPH: 11.6 %
B-GLOBULIN SERPL ELPH-MCNC: 0.9 G/DL
GAMMA GLOB FLD ELPH-MCNC: 1.7 G/DL
GAMMA GLOB MFR SERPL ELPH: 21 %
INTERPRETATION SERPL IEP-IMP: NORMAL
M PROTEIN SPEC IFE-MCNC: NORMAL
PROT SERPL-MCNC: 8 G/DL
PROT SERPL-MCNC: 8 G/DL

## 2023-02-13 NOTE — REVIEW OF SYSTEMS
[Vision Problems] : vision problems [Sore Throat] : sore throat [Cough] : cough [Diarrhea] : diarrhea [Fever] : no fever [Chills] : no chills [Night Sweats] : no night sweats [Nasal Discharge] : no nasal discharge [Postnasal Drip] : no postnasal drip [Chest Pain] : no chest pain [Palpitations] : no palpitations [Shortness Of Breath] : no shortness of breath [Abdominal Pain] : no abdominal pain [Nausea] : no nausea [Vomiting] : no vomiting [FreeTextEntry7] : no blood in stool

## 2023-02-14 ENCOUNTER — APPOINTMENT (OUTPATIENT)
Dept: RHEUMATOLOGY | Facility: CLINIC | Age: 73
End: 2023-02-14

## 2023-02-14 ENCOUNTER — RESULT REVIEW (OUTPATIENT)
Age: 73
End: 2023-02-14

## 2023-02-14 LAB — VIT B6 SERPL-MCNC: 8.2 UG/L

## 2023-02-16 ENCOUNTER — APPOINTMENT (OUTPATIENT)
Dept: PODIATRY | Facility: CLINIC | Age: 73
End: 2023-02-16

## 2023-02-16 LAB
ALBUMIN SERPL ELPH-MCNC: 4.4 G/DL
ALP BLD-CCNC: 101 U/L
ALT SERPL-CCNC: 11 U/L
ANION GAP SERPL CALC-SCNC: 13 MMOL/L
APPEARANCE: CLEAR
AST SERPL-CCNC: 16 U/L
BACTERIA: NEGATIVE
BASOPHILS # BLD AUTO: 0.04 K/UL
BASOPHILS NFR BLD AUTO: 0.7 %
BILIRUB SERPL-MCNC: 0.6 MG/DL
BILIRUBIN URINE: NEGATIVE
BLOOD URINE: NEGATIVE
BUN SERPL-MCNC: 12 MG/DL
CALCIUM SERPL-MCNC: 10.6 MG/DL
CALCIUM SERPL-MCNC: 10.6 MG/DL
CHLORIDE SERPL-SCNC: 98 MMOL/L
CHOLEST SERPL-MCNC: 224 MG/DL
CO2 SERPL-SCNC: 30 MMOL/L
COLOR: YELLOW
CREAT SERPL-MCNC: 0.91 MG/DL
EGFR: 68 ML/MIN/1.73M2
EOSINOPHIL # BLD AUTO: 0.11 K/UL
EOSINOPHIL NFR BLD AUTO: 2.1 %
FOLATE SERPL-MCNC: 2.3 NG/ML
GLUCOSE QUALITATIVE U: NEGATIVE
GLUCOSE SERPL-MCNC: 99 MG/DL
HCT VFR BLD CALC: 53.2 %
HDLC SERPL-MCNC: 66 MG/DL
HGB BLD-MCNC: 17.2 G/DL
HYALINE CASTS: 4 /LPF
IMM GRANULOCYTES NFR BLD AUTO: 0.4 %
KETONES URINE: NEGATIVE
LDLC SERPL CALC-MCNC: 132 MG/DL
LEUKOCYTE ESTERASE URINE: ABNORMAL
LYMPHOCYTES # BLD AUTO: 0.5 K/UL
LYMPHOCYTES NFR BLD AUTO: 9.4 %
MAN DIFF?: NORMAL
MCHC RBC-ENTMCNC: 32.3 GM/DL
MCHC RBC-ENTMCNC: 33.8 PG
MCV RBC AUTO: 104.5 FL
MICROSCOPIC-UA: NORMAL
MONOCYTES # BLD AUTO: 0.44 K/UL
MONOCYTES NFR BLD AUTO: 8.2 %
NEUTROPHILS # BLD AUTO: 4.23 K/UL
NEUTROPHILS NFR BLD AUTO: 79.2 %
NITRITE URINE: NEGATIVE
NONHDLC SERPL-MCNC: 158 MG/DL
PARATHYROID HORMONE INTACT: 45 PG/ML
PH URINE: 5.5
PLATELET # BLD AUTO: 205 K/UL
POTASSIUM SERPL-SCNC: 4.4 MMOL/L
PROT SERPL-MCNC: 7.6 G/DL
PROTEIN URINE: NORMAL
RBC # BLD: 5.09 M/UL
RBC # FLD: 16 %
RED BLOOD CELLS URINE: 2 /HPF
SODIUM SERPL-SCNC: 140 MMOL/L
SPECIFIC GRAVITY URINE: 1.02
SQUAMOUS EPITHELIAL CELLS: 3 /HPF
TRIGL SERPL-MCNC: 127 MG/DL
UROBILINOGEN URINE: NORMAL
WBC # FLD AUTO: 5.34 K/UL
WHITE BLOOD CELLS URINE: 1 /HPF

## 2023-02-21 ENCOUNTER — APPOINTMENT (OUTPATIENT)
Dept: NEUROLOGY | Facility: CLINIC | Age: 73
End: 2023-02-21
Payer: MEDICARE

## 2023-02-21 PROCEDURE — 95885 MUSC TST DONE W/NERV TST LIM: CPT

## 2023-02-21 PROCEDURE — 95910 NRV CNDJ TEST 7-8 STUDIES: CPT

## 2023-02-21 NOTE — PROCEDURE
[FreeTextEntry1] : EMG/NCS [FreeTextEntry3] : EMG/NCS of both legs was performed today and shows polyneuropathy primarily involving the sensory nerves.  \par \par EMG REPORT WILL BE UPLOADED SEPARATELY AS A PDF\par \par No neurogenic weakness was present on EMG indicating that her steppage gait is due to sensory rather than motor dysfunction. I explained how proprioceptive deficits cause imbalance. We discussed fall precautions at length and the importance of using visual attention to prevent falls.\par

## 2023-02-23 ENCOUNTER — APPOINTMENT (OUTPATIENT)
Dept: RHEUMATOLOGY | Facility: CLINIC | Age: 73
End: 2023-02-23
Payer: MEDICARE

## 2023-02-23 VITALS
WEIGHT: 172 LBS | SYSTOLIC BLOOD PRESSURE: 146 MMHG | DIASTOLIC BLOOD PRESSURE: 84 MMHG | HEIGHT: 65 IN | HEART RATE: 76 BPM | BODY MASS INDEX: 28.66 KG/M2 | OXYGEN SATURATION: 98 %

## 2023-02-23 DIAGNOSIS — M25.50 PAIN IN UNSPECIFIED JOINT: ICD-10-CM

## 2023-02-23 DIAGNOSIS — R76.8 OTHER SPECIFIED ABNORMAL IMMUNOLOGICAL FINDINGS IN SERUM: ICD-10-CM

## 2023-02-23 PROCEDURE — 99204 OFFICE O/P NEW MOD 45 MIN: CPT

## 2023-02-23 RX ORDER — ERGOCALCIFEROL 1.25 MG/1
1.25 MG CAPSULE, LIQUID FILLED ORAL
Qty: 8 | Refills: 0 | Status: DISCONTINUED | COMMUNITY
Start: 2022-12-12 | End: 2023-02-23

## 2023-02-23 NOTE — HISTORY OF PRESENT ILLNESS
[FreeTextEntry1] : 73yo F with PMHx Breast CA, HTN, polycythemia tx presented to the office for evaluation.\par \par History\par presented for annual exam\par at the time no major medical complains\par KVNG+\par patient history of chronic arthralgias\par denied constitutional symptoms\par denied synovitis, denied dactylitis\par denied oral ulcers\par denied raynauds\par denied inflammatory eye disease history\par chronic eczema rash since young age\par undergoing phlebotomy for elevated H/H\par on anastrazole for breast CA\par pending Liver MRI for evaluation of iron overload

## 2023-02-23 NOTE — PHYSICAL EXAM
[General Appearance - Alert] : alert [General Appearance - In No Acute Distress] : in no acute distress [Sclera] : the sclera and conjunctiva were normal [PERRL With Normal Accommodation] : pupils were equal in size, round, and reactive to light [] : no respiratory distress [Auscultation Breath Sounds / Voice Sounds] : lungs were clear to auscultation bilaterally [Heart Rate And Rhythm] : heart rate was normal and rhythm regular [Heart Sounds] : normal S1 and S2 [Edema] : there was no peripheral edema [Abnormal Walk] : normal gait [Nail Clubbing] : no clubbing  or cyanosis of the fingernails [Musculoskeletal - Swelling] : no joint swelling seen [Motor Tone] : muscle strength and tone were normal [No Focal Deficits] : no focal deficits [Oriented To Time, Place, And Person] : oriented to person, place, and time

## 2023-02-25 LAB
C3 SERPL-MCNC: 110 MG/DL
C4 SERPL-MCNC: 23 MG/DL
DSDNA AB SER-ACNC: <12 IU/ML

## 2023-03-01 LAB
CENTROMERE IGG SER-ACNC: <0.2 CD:130001892
ENA RNP AB SER IA-ACNC: <0.2 AL
ENA SCL70 IGG SER IA-ACNC: <0.2 AL
ENA SM AB SER IA-ACNC: <0.2 AL
ENA SS-A AB SER IA-ACNC: <0.2 AL
ENA SS-B AB SER IA-ACNC: <0.2 AL

## 2023-03-08 ENCOUNTER — APPOINTMENT (OUTPATIENT)
Dept: NEUROLOGY | Facility: CLINIC | Age: 73
End: 2023-03-08
Payer: MEDICARE

## 2023-03-08 VITALS
SYSTOLIC BLOOD PRESSURE: 162 MMHG | OXYGEN SATURATION: 89 % | HEART RATE: 91 BPM | BODY MASS INDEX: 28.66 KG/M2 | HEIGHT: 65 IN | DIASTOLIC BLOOD PRESSURE: 80 MMHG | WEIGHT: 172 LBS

## 2023-03-08 VITALS
HEIGHT: 65 IN | SYSTOLIC BLOOD PRESSURE: 162 MMHG | BODY MASS INDEX: 28.66 KG/M2 | HEART RATE: 91 BPM | DIASTOLIC BLOOD PRESSURE: 80 MMHG | WEIGHT: 172 LBS | OXYGEN SATURATION: 91 %

## 2023-03-08 PROCEDURE — 99212 OFFICE O/P EST SF 10 MIN: CPT

## 2023-03-08 RX ORDER — B-COMPLEX WITH VITAMIN C
TABLET ORAL
Refills: 0 | Status: DISCONTINUED | COMMUNITY
End: 2023-03-08

## 2023-03-08 NOTE — HISTORY OF PRESENT ILLNESS
[FreeTextEntry1] : Anna Larson is a 72 year old woman with a history of breast cancer 2016 with chemotherapy, radiation s/p mastectomy, hemochromatosis, cataract surgery,  presenting for a follow up to review EMG results.\par \par EMG consistent with polyneuropathy. She notes some tingling occasionally in the hands, no weakness. Pain currently controlled with Gabapentin. She is no longer taking Vitamin B complex. She endorses the numbness in her feet started prior to her chemotherapy and has been stable with no worsening. \par \par No change in balance. Saw rheumatology for positive KVNG.\par \par The remaining neurological review of systems is negative. \par \par \par 22 \par Anna Larson is a 72 year old woman with a history of breast cancer 2016 with chemotherapy, radiation s/p mastectomy, hemochromatosis, cataract surgery,  presenting for a consultation for numbness in both feet. \par \par The  numbness in both feet to the soles and by her toes started two years or more and has been stable since that time. Denies pain. Denies burning sensation. Denies back pain down the leg. Denies numbness or tingling in her fingers. No aggravating or relieving factors. There is  no pain that wakes her up at night. She is currently taking Gabapentin 300 mg nightly. \par \par She also reports knee pain. The knee pain started last year and notes a worsening when going up and down the steps. The discomfort resolves once she stops moving. She describes an aching sensation that is partially relieved with the Gabapentin that she is taking. Denies multiple joint pains. She feels unsteady when walking. No falls or injuries. Denies bowel or bladder difficulties. Ms. Larson did PT last year, not sure if there was any improvement. \par \par  \par Social history: no smoke or 2 drinks occasionally \par \par Family history: \par sister- Multiple myeloma, DM\par Brother- DM .\par Mother- Alzheimers \par \par The remaining neurological review of systems is negative. \par

## 2023-03-08 NOTE — CONSULT LETTER
[Dear  ___] : Dear  [unfilled], [Consult Letter:] : I had the pleasure of evaluating your patient, [unfilled]. [Please see my note below.] : Please see my note below. [Consult Closing:] : Thank you very much for allowing me to participate in the care of this patient.  If you have any questions, please do not hesitate to contact me. [Sincerely,] : Sincerely, [FreeTextEntry3] : Amaya James NKiloP.\par

## 2023-03-08 NOTE — DATA REVIEWED
[de-identified] : 2/21/23 This is an abnormal EMG/NCS. There is electrophysiologic evidence of axonal polyneuropathy with its impacting the sensory more than motor nerves. There is no neurogenic weakness in the distal leg muscles.

## 2023-03-08 NOTE — ASSESSMENT
[FreeTextEntry1] : Anna Larson is a 72 year old woman with a history of breast cancer presenting with a peripheral neuropathy possibly secondary to chemotherapy. \par \par  Serological workup to look for any other identifiable causes revealed positive KVNG. Rheumatology evaluation appreciated.  \par Advised against B complex. An elevated B6 can cause further damage to nerves.\par Daily foot exams. \par She is not currently in pain with her home dose of Gabapentin. \par Continue with podiatry follow up. \par  \par \par We discussed doing EMG/NCV arms and mutually agreed we will revisit doing it if symptoms worsen.\par \par Follow up PRN.

## 2023-03-08 NOTE — PHYSICAL EXAM
[FreeTextEntry1] : Physical examination \par General: No acute distress, Awake, Alert.   \par  \par Mental status \par Awake, alert,gives detailed history.\par \par Cranial Nerves \par II: VFF  \par III, IV, VI: PERRL, EOMI.   \par V: Facial sensation is normal B/L.   \par VII: Facial strength is normal B/L. \par \par \par VIII: Gross hearing is intact.   \par  \par \par IX, X: Palate is midline and elevates symmetrically.   \par XI: Trapezius normal strength.   \par XII: Tongue midline without atrophy or fasciculations. \par \par Motor exam  \par Muscle tone - no evidence of rigidity or resistance in all 4 extremities.  \par No atrophy or fasciculations \par Muscle Strength: arms and legs, proximal and distal flexors and extensors are normal \par \par No UE drift.\par \par Reflexes\par 2+ \par Absent ankles  \par \par Coordination \par Finger to nose: Normal.  \par Heel to shin: Normal.   \par  \par \par Sensory \par Intact sensation to PP and proprioception. \par Decreased vibration MM B.\par Decreased cold up to ankles B. \par  \par  \par Gait \par  \par able to do heel, toe \par  \par \par Positive Romberg. \par \par

## 2023-04-05 ENCOUNTER — APPOINTMENT (OUTPATIENT)
Dept: HEMATOLOGY ONCOLOGY | Facility: CLINIC | Age: 73
End: 2023-04-05

## 2023-04-17 ENCOUNTER — RESULT REVIEW (OUTPATIENT)
Age: 73
End: 2023-04-17

## 2023-04-17 ENCOUNTER — APPOINTMENT (OUTPATIENT)
Dept: HEMATOLOGY ONCOLOGY | Facility: CLINIC | Age: 73
End: 2023-04-17

## 2023-04-17 VITALS
OXYGEN SATURATION: 97 % | BODY MASS INDEX: 29.5 KG/M2 | RESPIRATION RATE: 16 BRPM | HEART RATE: 79 BPM | WEIGHT: 177.06 LBS | TEMPERATURE: 97.8 F | SYSTOLIC BLOOD PRESSURE: 176 MMHG | DIASTOLIC BLOOD PRESSURE: 85 MMHG | HEIGHT: 65 IN

## 2023-04-18 RX ORDER — FOLIC ACID 1 MG/1
1 TABLET ORAL DAILY
Qty: 90 | Refills: 0 | Status: ACTIVE | COMMUNITY
Start: 2023-04-18 | End: 1900-01-01

## 2023-05-05 ENCOUNTER — RESULT REVIEW (OUTPATIENT)
Age: 73
End: 2023-05-05

## 2023-05-08 ENCOUNTER — APPOINTMENT (OUTPATIENT)
Dept: THORACIC SURGERY | Facility: CLINIC | Age: 73
End: 2023-05-08
Payer: MEDICARE

## 2023-05-08 VITALS — WEIGHT: 170 LBS | BODY MASS INDEX: 28.32 KG/M2 | HEIGHT: 65 IN

## 2023-05-08 DIAGNOSIS — Z87.891 PERSONAL HISTORY OF NICOTINE DEPENDENCE: ICD-10-CM

## 2023-05-08 PROCEDURE — G0296 VISIT TO DETERM LDCT ELIG: CPT

## 2023-05-08 NOTE — PLAN
[Smoking Cessation] : smoking cessation [FreeTextEntry1] : Plan:\par \par -Low dose CT chest for lung cancer screening.  Pili Mcdaniel NP  ordered the low dose CT.      \par \par -Follow up with patient and her referring provider after her LDCT results have been reviewed by the multidisciplinary clinical team, if needed.\par \par -Encourage continued smoking abstinence.\par \par Should I screen? tool utilized. 6 Year risk of lung cancer is   2.4%. \par \par Patient wishes to proceed with screening.\par \par Engaged in discussion regarding risks of screening during Covid-19 pandemic and precautions that are being used  to reduce exposure.\par \par Engaged in shared decision making with Ms. COE . Answered all questions. She verbalized understanding and agreement. She knows to call back with and questions or concerns.\par \par    \par \par \par \par \par

## 2023-05-08 NOTE — DATA REVIEWED
[Lung Cancer Screening] : Patient underwent lung cancer screening [2] : 2 [TextBox_12] : 04/22 [TextBox_52] : 1

## 2023-05-08 NOTE — HISTORY OF PRESENT ILLNESS
[Former] : Former [TextBox_13] : Responded to reminder\par Dr. Paola Hui\par \par KRISTI COE had telephonic visit for a review of eligibility and discussion of the Low dose CT lung cancer screening program. The following was reviewed and confirmed the patient meets screening eligibility criteria.\par Smoking Status:\par -Former smoker\par Smoked 8 cigarettes per day on average for 50 years.\par -Number of pack years smokin\par -Number of years since quitting smoking: 3\par -Quit year: 2020\par \par Ms. COE denies any signs or symptoms of lung cancer including new cough, changing cough, hemoptysis, and no new unintentional weight loss. \par \par Ms. COE report right breast cancer 2015. She denies HX of Covid infection but it is listed in her history Denies any history of lung disease.  She denies any personal history of lung cancer. Reports no lung cancer in a 1st degree relative. Reports no lung cancer in a 2nd degree relative. Denies any history of occupational exposures. Has no exposure to 2nd hand smoke.\par  \par \par  [YearQuit] : 2020 [PacksperYear] : 20

## 2023-05-16 ENCOUNTER — RESULT REVIEW (OUTPATIENT)
Age: 73
End: 2023-05-16

## 2023-05-17 ENCOUNTER — NON-APPOINTMENT (OUTPATIENT)
Age: 73
End: 2023-05-17

## 2023-09-12 RX ORDER — AMLODIPINE AND VALSARTAN 10; 320 MG/1; MG/1
10-320 TABLET, FILM COATED ORAL DAILY
Qty: 90 | Refills: 3 | Status: ACTIVE | COMMUNITY
Start: 2021-06-16 | End: 1900-01-01

## 2023-10-06 ENCOUNTER — APPOINTMENT (OUTPATIENT)
Dept: HEMATOLOGY ONCOLOGY | Facility: CLINIC | Age: 73
End: 2023-10-06
Payer: MEDICARE

## 2023-10-06 ENCOUNTER — RESULT REVIEW (OUTPATIENT)
Age: 73
End: 2023-10-06

## 2023-10-06 VITALS
RESPIRATION RATE: 16 BRPM | WEIGHT: 169.44 LBS | OXYGEN SATURATION: 96 % | DIASTOLIC BLOOD PRESSURE: 74 MMHG | BODY MASS INDEX: 28.23 KG/M2 | SYSTOLIC BLOOD PRESSURE: 149 MMHG | TEMPERATURE: 97.6 F | HEIGHT: 65 IN | HEART RATE: 85 BPM

## 2023-10-06 DIAGNOSIS — E53.8 DEFICIENCY OF OTHER SPECIFIED B GROUP VITAMINS: ICD-10-CM

## 2023-10-06 DIAGNOSIS — Z79.811 LONG TERM (CURRENT) USE OF AROMATASE INHIBITORS: ICD-10-CM

## 2023-10-06 DIAGNOSIS — Z78.0 ASYMPTOMATIC MENOPAUSAL STATE: ICD-10-CM

## 2023-10-06 DIAGNOSIS — Z87.891 PERSONAL HISTORY OF NICOTINE DEPENDENCE: ICD-10-CM

## 2023-10-06 DIAGNOSIS — Z85.3 PERSONAL HISTORY OF MALIGNANT NEOPLASM OF BREAST: ICD-10-CM

## 2023-10-06 DIAGNOSIS — Z17.0 ESTROGEN RECEPTOR POSITIVE STATUS [ER+]: ICD-10-CM

## 2023-10-06 DIAGNOSIS — E83.110 HEREDITARY HEMOCHROMATOSIS: ICD-10-CM

## 2023-10-06 PROCEDURE — 36415 COLL VENOUS BLD VENIPUNCTURE: CPT

## 2023-10-06 PROCEDURE — 99214 OFFICE O/P EST MOD 30 MIN: CPT | Mod: 25

## 2023-10-06 RX ORDER — TACROLIMUS 1 MG/G
0.1 OINTMENT TOPICAL
Refills: 0 | Status: COMPLETED | COMMUNITY
Start: 2022-05-25 | End: 2023-10-06

## 2023-10-06 RX ORDER — HYDROCHLOROTHIAZIDE 25 MG/1
25 TABLET ORAL
Qty: 90 | Refills: 3 | Status: COMPLETED | COMMUNITY
Start: 2021-06-16 | End: 2023-10-06

## 2023-10-06 RX ORDER — ANASTROZOLE TABLETS 1 MG/1
1 TABLET ORAL DAILY
Qty: 30 | Refills: 0 | Status: ACTIVE | COMMUNITY
Start: 2021-04-27

## 2023-10-06 RX ORDER — ALCLOMETASONE DIPROPIONATE 0.5 MG/G
0.05 OINTMENT TOPICAL
Qty: 15 | Refills: 0 | Status: COMPLETED | COMMUNITY
Start: 2022-03-10 | End: 2023-10-06

## 2023-10-13 ENCOUNTER — APPOINTMENT (OUTPATIENT)
Dept: INTERNAL MEDICINE | Facility: CLINIC | Age: 73
End: 2023-10-13
Payer: MEDICARE

## 2023-10-13 VITALS
WEIGHT: 166 LBS | RESPIRATION RATE: 16 BRPM | SYSTOLIC BLOOD PRESSURE: 132 MMHG | HEART RATE: 78 BPM | BODY MASS INDEX: 27.66 KG/M2 | HEIGHT: 65 IN | DIASTOLIC BLOOD PRESSURE: 82 MMHG | OXYGEN SATURATION: 95 % | TEMPERATURE: 97.8 F

## 2023-10-13 DIAGNOSIS — R73.09 OTHER ABNORMAL GLUCOSE: ICD-10-CM

## 2023-10-13 DIAGNOSIS — G60.9 HEREDITARY AND IDIOPATHIC NEUROPATHY, UNSPECIFIED: ICD-10-CM

## 2023-10-13 DIAGNOSIS — T45.1X5A DRUG-INDUCED POLYNEUROPATHY: ICD-10-CM

## 2023-10-13 DIAGNOSIS — E78.5 HYPERLIPIDEMIA, UNSPECIFIED: ICD-10-CM

## 2023-10-13 DIAGNOSIS — I10 ESSENTIAL (PRIMARY) HYPERTENSION: ICD-10-CM

## 2023-10-13 DIAGNOSIS — E87.0 HYPEROSMOLALITY AND HYPERNATREMIA: ICD-10-CM

## 2023-10-13 DIAGNOSIS — G62.0 DRUG-INDUCED POLYNEUROPATHY: ICD-10-CM

## 2023-10-13 PROCEDURE — 99214 OFFICE O/P EST MOD 30 MIN: CPT

## 2023-10-13 RX ORDER — GABAPENTIN 300 MG/1
300 CAPSULE ORAL AT BEDTIME
Qty: 90 | Refills: 3 | Status: ACTIVE | COMMUNITY
Start: 2023-10-13 | End: 1900-01-01

## 2023-10-22 PROBLEM — E78.5 HYPERLIPIDEMIA, UNSPECIFIED HYPERLIPIDEMIA TYPE: Status: ACTIVE | Noted: 2021-06-16

## 2023-10-22 PROBLEM — I10 HYPERTENSION, UNSPECIFIED TYPE: Status: ACTIVE | Noted: 2021-04-27

## 2023-10-22 PROBLEM — G60.9 IDIOPATHIC PERIPHERAL NEUROPATHY: Status: ACTIVE | Noted: 2022-07-27

## 2023-10-22 RX ORDER — EMOLLIENT COMBINATION NO.32
EMULSION, EXTENDED RELEASE TOPICAL
Refills: 0 | Status: ACTIVE | COMMUNITY
Start: 2023-10-22

## 2023-10-23 ENCOUNTER — RESULT REVIEW (OUTPATIENT)
Age: 73
End: 2023-10-23

## 2023-12-31 PROBLEM — Z23 NEED FOR 23-POLYVALENT PNEUMOCOCCAL POLYSACCHARIDE VACCINE: Status: ACTIVE | Noted: 2021-11-20

## 2024-04-03 NOTE — ASSESSMENT
[FreeTextEntry1] : #  ER + 100%, KS + 5%, Her 2 neg  R sided breast cancer T2N1 stage IIB in 2015, s/p neoadjuvant ddAC-T 5/2016, s/p right mastectomy and ALND 6/2016 - remains on arimadex - would keep for a minimum of 10 years. Continue with ca++1200, vit D 1000 IU CA 27 -29 wnl, elevated CEA annual mammogram/US ordered for 2/2022 4/19/2022 continue anastrazole; mammo reviewed, repeat 12 months reviewed 7/20/22 -vs and labs reviewed. continue anastrozole. ca++ and vit D 12/7/2022 vs and CBC reviewed; WBC 5.3, hgb 15.2, plt 233; continue anastrazole; left mammo/US ordered for 3/2023 10/6/23 vs reviewed. labs drawn in office today. wbc 4.2 - monitor, hgb 15.7, hct 47. plts wnl. due for mammo/us 5/24. reviewed mammo/sono 5/23 - no evidence of malignancy. Due for DEXA  #joint pains - knees 2/2 arimadex continue tylenol arthritis and voltaren gel referred for PT  #elevated CEA  and loose stools last [facility] 2018  s/p Dr. Oconnor + [facility] 2/2022: polyps and significant diverticulitis - sessile serrated adenoma - needs repeat in 2027   #HTN  better controlled  #tobacco abuse and elevated CEA 5/2023 LDCT negative  #Hemochromatosis carrier, heterozygous H63D erythrocytosis/macrocytosis  started on folic acid - pending folate MRI liver - 2/23 - no iron in liver, mild hepatic steatosis pending iron and ferritin but may need phlebotomy  #ongoing skin irritation seeing dermatologist Dr. James   #family history of breast, ovarian cancer and known family history of vines syndrome  #Health maintenance Mammo/sono 5/23 - no evidence of disease GYN - last seen 3/22 [facility] 2022 - due again in 2027 DEXA - needed and ordered RTC in 6 months with CBC with diff, CMP, Vit D, irons, B12, folate

## 2024-04-03 NOTE — PHYSICAL EXAM
[Fully active, able to carry on all pre-disease performance without restriction] : Status 0 - Fully active, able to carry on all pre-disease performance without restriction [Normal] : affect appropriate [de-identified] : Right sided mastectomy - Left side without masses or axillary adenopathy

## 2024-04-03 NOTE — CONSULT LETTER
[Dear  ___] : Dear  [unfilled], [Consult Letter:] : I had the pleasure of evaluating your patient, [unfilled]. [Please see my note below.] : Please see my note below. [Consult Closing:] : Thank you very much for allowing me to participate in the care of this patient.  If you have any questions, please do not hesitate to contact me. [Sincerely,] : Sincerely, [FreeTextEntry3] : Paola Hui DO, FACBO, FACP\par  Medical Oncology and Hematology\par  [facility]\par

## 2024-04-03 NOTE — HISTORY OF PRESENT ILLNESS
[de-identified] : Ms. Petersen is a 70 year old woman who presents for consultation of right breast cancer.\par  She reports getting annual mammograms and they were normal until her annual .\par  She was previously seen and treated by Dr Wilcox.; diagnosed T2N1 stage IIB in , s/p neoadjuvant ddAC-T 2016, s/p right mastectomy and ALND 2016\par  ER + 100%, IA + 5%, Her 2 neg\par  Started arimidex 2016, delay in wound healing, wound dehiscence and possible infection\par  s/p XRT 2016-2016\par  Last vsit with Dr. 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, right thyroid nodule 1.3cm\par  2020 Flow cytometry for erythrocytosis No evidence or immunophenotypic evidence of lymphoproliferative disorder, high grade myelodysplastia, or acute lekemia\par  \par  She reports numbness to fingers and toes (toes worse), occasionally feels dizzy/lightheaded, taste buds compromised, chronic constipation.\par  \par  Age of Menarche: 14\par  Age of Menopause: 49\par  OCP/HRT: denies\par  [id number], miscarriage x1\par  \par  Smoke: started age 20, 2-3 cigarettes per day, quit for a few years, no back to occasional cigarettes due to stress\par  ETOH: 1-2 drinks x 2 nights per week\par  Illicit: denies\par  \par  Health Maintenance\par  Mammo/US: 2021- normal, not concern\par  DEXA: due, last 2018, normal\par  Last GYN 2018, due\par  PET 2018 unremarkable\par  CEA 6.8 (6.2 2021) and Ca 27-29 18.1 (18.9 2021)\par  Colonoscopy/EGD last , Dr. 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 She reports continuing numbness to her feet and it is still affecting her walking.   She continues on anastrazole without any issues. States she stopped taking hydrochlorothiazide herself without consulting physician

## 2024-04-03 NOTE — REVIEW OF SYSTEMS
[Eye Pain] : eye pain [Red Eyes] : red eyes [Dry Eyes] : dryness of the eyes [Dysphagia] : dysphagia [Insomnia] : insomnia [Negative] : Allergic/Immunologic

## 2024-04-10 ENCOUNTER — APPOINTMENT (OUTPATIENT)
Dept: HEMATOLOGY ONCOLOGY | Facility: CLINIC | Age: 74
End: 2024-04-10

## 2024-09-30 ENCOUNTER — NON-APPOINTMENT (OUTPATIENT)
Age: 74
End: 2024-09-30